# Patient Record
(demographics unavailable — no encounter records)

---

## 2024-10-25 NOTE — ASSESSMENT
[FreeTextEntry1] : Unable to assess vaccination and age appropriate screening due to time constraint (pt late for visit for 30min)

## 2024-10-25 NOTE — PLAN
[FreeTextEntry1] : HCM - Labs today: CBC, CMP, tissue transglutaminase IgA, B12, BNP, urine A/C, HIV, HCV, lipid panel    Vax: to be assessed next visit  [ ] Flu:  [ ] COVID:  [ ] Tdap (q10yrs):  [ ] Prevnar (<65 w/ risk factors or >65):  [ ] Shingrex (<50 if immunocompromised or >50, 2 doses 2-6 months apart):    Screening [ ] HIV (15-65): check today   [ ] HCV (18-79): check today  [ ] Colonoscopy (q10yr 45-75): GI referral given    RTC in 5 weeks    Discussed w/ Dr. Medina

## 2024-10-25 NOTE — PHYSICAL EXAM
[No Acute Distress] : no acute distress [PERRL] : pupils equal round and reactive to light [EOMI] : extraocular movements intact [Normal Oropharynx] : the oropharynx was normal [No JVD] : no jugular venous distention [Supple] : supple [No Respiratory Distress] : no respiratory distress  [Clear to Auscultation] : lungs were clear to auscultation bilaterally [Regular Rhythm] : with a regular rhythm [Normal S1, S2] : normal S1 and S2 [No Murmur] : no murmur heard [No Varicosities] : no varicosities [Soft] : abdomen soft [Non Tender] : non-tender [Non-distended] : non-distended [No Spinal Tenderness] : no spinal tenderness [No Joint Swelling] : no joint swelling [Grossly Normal Strength/Tone] : grossly normal strength/tone [Coordination Grossly Intact] : coordination grossly intact [No Focal Deficits] : no focal deficits [Normal Gait] : normal gait [Normal Affect] : the affect was normal [Normal Insight/Judgement] : insight and judgment were intact [de-identified] : Regular, mildly tachycardic 100s  [de-identified] : 2_ pitting edema to b/l mid-shin  [de-identified] : hemorrhagic 1cm circular blister x1 in each hand, callous in hand nuckalebes

## 2024-10-25 NOTE — HISTORY OF PRESENT ILLNESS
[Admitted on: ___] : The patient was admitted on [unfilled] [Discharged on ___] : discharged on [unfilled] [FreeTextEntry2] : 50yom w/ PMH of recently dx T1DM during recent hospitalization at [facility] 10/2 - 10/9/24 for worsening fatigue/myalgias found to have HbA1c 14% and mild DKA (not requiring insulin gtt).  C-peptide low 0.4, NI positive 1.12, Zinc transporter 8 Ab positive 67. (IA-2 negative). Patient also with neuropathy described as heaviness. CT lumbar notable for L4-5 disc bulge and bilateral hypertrophic facet joint changes. Mild narrowing of both neural foramen. Started on gabapentin, Lantus 27u + 7u premeal on discharge.   Patient reports since discharge, he has been taking insulin as prescribed except for when he gets hypoglycemic to 40s to 50s in the morning.  He would decrease his morning Lantus from 27 units to 10 or 15 units.  Complained of chronic lower extremity swelling for 1 year, chronic alternating constipation with diarrhea for 1 year, chronic shooting pain from bilateral buttock to toes with decreased sensation of both feet.  Endorse fatigue, feeling cold.  Unspecified weight loss. Denies chest pain, shortness of breath, abdominal pain, fever, chills, nausea/vomiting.   # Type 1 diabetes -Newly diagnosed during recent hospitalization, A1c 14% -Reports has no follow-up appointment with endocrinology -Understand importance of  not missing insulin doses -Has kimberly 3, reports shows very high 38%, high 20%, target range 36%, low 5%, very low 1%.  # Diabetic neuropathy -Reports decree sensation of both feet with numbness/tingling -Has been taking gabapentin 300 3 times daily with mild improvement -Never seen a podiatrist  # Chronic diarrhea/constipation -Report for the last week has been having 5 loose BM a day, previously alternating with constipation for about a year -Denies abdominal pain, nausea/vomiting, fever/chills  # Lower extremity swelling -Reports over 1 year of lower extremity swelling, pitting -Unsure if worse at the end of day, states pretty constant, requesting water pill

## 2024-10-25 NOTE — REVIEW OF SYSTEMS
[Fatigue] : fatigue [Constipation] : constipation [Diarrhea] : diarrhea [Fever] : no fever [Chills] : no chills [Vision Problems] : no vision problems [Chest Pain] : no chest pain [Orthopena] : no orthopnea [Shortness Of Breath] : no shortness of breath [Abdominal Pain] : no abdominal pain [Nausea] : no nausea [Dysuria] : no dysuria [Vomiting] : no vomiting [Joint Pain] : no joint pain [Headache] : no headache [Easy Bleeding] : no easy bleeding

## 2024-10-25 NOTE — PHYSICAL EXAM
[No Acute Distress] : no acute distress [PERRL] : pupils equal round and reactive to light [EOMI] : extraocular movements intact [Normal Oropharynx] : the oropharynx was normal [No JVD] : no jugular venous distention [Supple] : supple [No Respiratory Distress] : no respiratory distress  [Clear to Auscultation] : lungs were clear to auscultation bilaterally [Regular Rhythm] : with a regular rhythm [Normal S1, S2] : normal S1 and S2 [No Murmur] : no murmur heard [No Varicosities] : no varicosities [Soft] : abdomen soft [Non Tender] : non-tender [Non-distended] : non-distended [No Spinal Tenderness] : no spinal tenderness [No Joint Swelling] : no joint swelling [Grossly Normal Strength/Tone] : grossly normal strength/tone [Coordination Grossly Intact] : coordination grossly intact [No Focal Deficits] : no focal deficits [Normal Gait] : normal gait [Normal Affect] : the affect was normal [Normal Insight/Judgement] : insight and judgment were intact [de-identified] : Regular, mildly tachycardic 100s  [de-identified] : hemorrhagic 1cm circular blister x1 in each hand, callous in hand nuckalebes  [de-identified] : 2_ pitting edema to b/l mid-shin

## 2024-11-08 NOTE — HISTORY OF PRESENT ILLNESS
[FreeTextEntry8] : Patient is a 50 year old man with a history of T1DM on insulin who presents to clinic for an acute visit for 10/10 low back pain. Patient has been dealing with this pain for around 2 years. The pain is sharp/burning and starts in the lower back and radiates down the bilateral legs to the heels. Pain is described as unrelenting. Pain does not change with ambulation/activity or with position changes. Patient has been given gabapentin 400 mg TID in the past but has not worked. He has also tried OTC tylenol and ibuprofen which have not resolved the symptoms. Patient tried chiropractor as was not satisfied. Patient not interested in narcotics. Of note patient noting some bowel incontinence. She notes about 1-2 times/week he will have either difficulty making it to the bathroom or will defecate in his pants. This is relatively new occurring over the last several months. Patient denies urinary incontinence or saddle anesthesia. Patient was recently admitted to the hospital from 10/2-10/9/2024 found to be in mild DKA. CT lumbar there notable for L4-5 disc bulge and bilateral hypertrophic facet joint changes. Mild narrowing of both neural foramen. Patient ROS negative for fevers or chills.

## 2024-11-08 NOTE — REVIEW OF SYSTEMS
[Back Pain] : back pain [Unsteady Walk] : ataxia [Fever] : no fever [Chills] : no chills [Chest Pain] : no chest pain [Shortness Of Breath] : no shortness of breath [Abdominal Pain] : no abdominal pain [Dysuria] : no dysuria [Incontinence] : no incontinence [Hesitancy] : no hesitancy [Frequency] : no frequency [Joint Stiffness] : no joint stiffness [Muscle Weakness] : no muscle weakness [Headache] : no headache [Dizziness] : no dizziness [Fainting] : no fainting [Memory Loss] : no memory loss

## 2024-11-08 NOTE — ASSESSMENT
[FreeTextEntry1] :  #HCM #T1DM #HTN - To be followed at next appointment on 12/2/2024 with Dr. Refugio Cobb PGY-1 Case Discussed with Dr. [facility]  RTC on 12/2/2024 with Dr. Huff to manage T1DM, HTN, and f/u on lumbar imaging or spine referral recs

## 2024-11-08 NOTE — PHYSICAL EXAM
[Well Nourished] : well nourished [Well Developed] : well developed [Normal Sclera/Conjunctiva] : normal sclera/conjunctiva [EOMI] : extraocular movements intact [No JVD] : no jugular venous distention [Supple] : supple [No Edema] : there was no peripheral edema [Normal] : affect was normal and insight and judgment were intact [de-identified] : ill-appearing, in pain [de-identified] : Straight leg raise positive for pain bilaterally [de-identified] : unsteady gait due to pain

## 2024-11-08 NOTE — PHYSICAL EXAM
[Well Nourished] : well nourished [Well Developed] : well developed [Normal Sclera/Conjunctiva] : normal sclera/conjunctiva [EOMI] : extraocular movements intact [No JVD] : no jugular venous distention [Supple] : supple [No Edema] : there was no peripheral edema [Normal] : affect was normal and insight and judgment were intact [de-identified] : ill-appearing, in pain [de-identified] : Straight leg raise positive for pain bilaterally [de-identified] : unsteady gait due to pain

## 2024-11-14 NOTE — RESULTS/DATA
[FreeTextEntry1] : CC: [id number] EXAM: CT LUMBAR SPINE ORDERED BY: ELIZABETH MARION  PROCEDURE DATE: 10/02/2024    INTERPRETATION: Clinical indications: Back pain.  Multiple axial sections were performed through the lumbar spine. Coronal and sagittal reconstructions were performed  Loss of the normal cervical lordosis is seen  The vertebral body height and alignment appear normal.  No acute fractures or dislocations are identified  T11-12: Normal  T12-L1: Normal  L1-2: Normal  L2-3: Normal  L3-4: Mild disc bulge is seen without significant compromise of the spinal canal or either neural foramen  L4-5: Disc bulge and bilateral hypertrophic facet joint changes. Mild narrowing of both neural foramen  Sacralization of L5 is seen.  L5-S1: Normal  Evaluation of the paraspinal soft tissues is limited by lack of IV contrast though grossly normal  Dilated bladder is identified. Please correlate clinically  IMPRESSION: Mild degenerative changes as described above.    --- End of Report ---      RONAL LIND MD; Attending Radiologis

## 2024-11-14 NOTE — PHYSICAL EXAM
[General Appearance - Alert] : alert [General Appearance - In No Acute Distress] : in no acute distress [Person] : oriented to person [Place] : oriented to place [Time] : oriented to time [5] : L4/5 ankle dorsiflexors 5/5 [Sensation Tactile Decrease] : light touch was intact [2+] : Ankle jerk left 2+ [Short Term Intact] : short term memory intact [Remote Intact] : remote memory intact [Span Intact] : the attention span was normal [Concentration Intact] : normal concentrating ability [Fluency] : fluency intact [Comprehension] : comprehension intact [Current Events] : adequate knowledge of current events [Past History] : adequate knowledge of personal past history [Vocabulary] : adequate range of vocabulary [Cranial Nerves Optic (II)] : visual acuity intact bilaterally,  pupils equal round and reactive to light [Cranial Nerves Oculomotor (III)] : extraocular motion intact [Cranial Nerves Trigeminal (V)] : facial sensation intact symmetrically [Cranial Nerves Facial (VII)] : face symmetrical [Cranial Nerves Vestibulocochlear (VIII)] : hearing was intact bilaterally [Cranial Nerves Glossopharyngeal (IX)] : tongue and palate midline [Cranial Nerves Accessory (XI - Cranial And Spinal)] : head turning and shoulder shrug symmetric [Cranial Nerves Hypoglossal (XII)] : there was no tongue deviation with protrusion [Motor Tone] : muscle tone was normal in all four extremities [Motor Strength] : muscle strength was normal in all four extremities [No Muscle Atrophy] : normal bulk in all four extremities [Abnormal Walk] : normal gait [Balance] : balance was intact [Past-pointing] : there was no past-pointing [Tremor] : no tremor present [Maria] : Maria's sign was not demonstrated [No Tenderness to Palpation] : no spine tenderness on palpation [Straight-Leg Raise Test - Left] : straight leg raise of the left leg was negative [Straight-Leg Raise Test - Right] : straight leg raise  of the right leg was negative [Able to toe walk] : the patient was able to toe walk [Able to heel walk] : the patient was able to heel walk

## 2024-11-14 NOTE — PHYSICAL EXAM
[General Appearance - Alert] : alert [General Appearance - In No Acute Distress] : in no acute distress [Person] : oriented to person [Place] : oriented to place [Time] : oriented to time [5] : L4/5 ankle dorsiflexors 5/5 [Sensation Tactile Decrease] : light touch was intact [2+] : Ankle jerk left 2+ [Short Term Intact] : short term memory intact [Remote Intact] : remote memory intact [Span Intact] : the attention span was normal [Concentration Intact] : normal concentrating ability [Fluency] : fluency intact [Comprehension] : comprehension intact [Current Events] : adequate knowledge of current events [Past History] : adequate knowledge of personal past history [Vocabulary] : adequate range of vocabulary [Cranial Nerves Optic (II)] : visual acuity intact bilaterally,  pupils equal round and reactive to light [Cranial Nerves Trigeminal (V)] : facial sensation intact symmetrically [Cranial Nerves Oculomotor (III)] : extraocular motion intact [Cranial Nerves Facial (VII)] : face symmetrical [Cranial Nerves Vestibulocochlear (VIII)] : hearing was intact bilaterally [Cranial Nerves Glossopharyngeal (IX)] : tongue and palate midline [Cranial Nerves Accessory (XI - Cranial And Spinal)] : head turning and shoulder shrug symmetric [Cranial Nerves Hypoglossal (XII)] : there was no tongue deviation with protrusion [Motor Tone] : muscle tone was normal in all four extremities [Motor Strength] : muscle strength was normal in all four extremities [No Muscle Atrophy] : normal bulk in all four extremities [Abnormal Walk] : normal gait [Balance] : balance was intact [Past-pointing] : there was no past-pointing [Tremor] : no tremor present [Maria] : Maira's sign was not demonstrated [No Tenderness to Palpation] : no spine tenderness on palpation [Straight-Leg Raise Test - Left] : straight leg raise of the left leg was negative [Straight-Leg Raise Test - Right] : straight leg raise  of the right leg was negative [Able to toe walk] : the patient was able to toe walk [Able to heel walk] : the patient was able to heel walk

## 2024-11-14 NOTE — HISTORY OF PRESENT ILLNESS
[de-identified] : Jacek is here fora new patient visit.  Chief complaint of low back pain that radiated to his buttocks down the back of his leg.  He has associated numbness and tingling of both legs in addition to both hands. Has not seen any neurologist or rheumatologist yet. He also describes temperature changes in his hands and feet.  He is a diabetic.  He also describes some intermittent swelling of his hands and his feet.  He describes pain in multiple areas. A CT scan of the lumbar spine did not show any fracture or subluxation.

## 2024-11-14 NOTE — REVIEW OF SYSTEMS
[Numbness] : numbness [Tingling] : tingling [Abnormal Sensation] : an abnormal sensation [As Noted in HPI] : as noted in HPI [Negative] : Heme/Lymph [FreeTextEntry9] : low back pain

## 2024-11-14 NOTE — RESULTS/DATA
[FreeTextEntry1] : CC: [id number] EXAM: CT LUMBAR SPINE ORDERED BY: ELIZABETH MARION  PROCEDURE DATE: 10/02/2024    INTERPRETATION: Clinical indications: Back pain.  Multiple axial sections were performed through the lumbar spine. Coronal and sagittal reconstructions were performed  Loss of the normal cervical lordosis is seen  The vertebral body height and alignment appear normal.  No acute fractures or dislocations are identified  T11-12: Normal  T12-L1: Normal  L1-2: Normal  L2-3: Normal  L3-4: Mild disc bulge is seen without significant compromise of the spinal canal or either neural foramen  L4-5: Disc bulge and bilateral hypertrophic facet joint changes. Mild narrowing of both neural foramen  Sacralization of L5 is seen.  L5-S1: Normal  Evaluation of the paraspinal soft tissues is limited by lack of IV contrast though grossly normal  Dilated bladder is identified. Please correlate clinically  IMPRESSION: Mild degenerative changes as described above.    --- End of Report ---      RONAL LIND MD; Attending Radiologis

## 2024-11-14 NOTE — HISTORY OF PRESENT ILLNESS
[de-identified] : Jacek is here fora new patient visit.  Chief complaint of low back pain that radiated to his buttocks down the back of his leg.  He has associated numbness and tingling of both legs in addition to both hands. Has not seen any neurologist or rheumatologist yet. He also describes temperature changes in his hands and feet.  He is a diabetic.  He also describes some intermittent swelling of his hands and his feet.  He describes pain in multiple areas. A CT scan of the lumbar spine did not show any fracture or subluxation.

## 2024-11-14 NOTE — ASSESSMENT
[FreeTextEntry1] : Impression: 50yr old male with chief complaint of low back pain that radiated to his buttocks down the back of his leg. He has associated numbness and tingling of both legs in addition to both hands. Neurological exam normal  CT lumbar spine 10/02/2024- degenerative changes   Plan: Do not recommend surgery Recommend referral to neurologist Dr. Dianne Alford

## 2024-11-25 NOTE — PHYSICAL EXAM
[de-identified] : Gen: NAD Head: NC/AT Eyes: no glasses, no scleral icterus ENT: mucous membranes moist CV: No JVD Lungs: nonlabored breathing Abd: soft, NT/ND Ext: full ROM in all extremities, no peripheral edema Back: +TTPI in the lumbar paraspinal region, +SLR bilaterally Neuro: CN intact LEs +5 L +5 R hip flexion +5 L +5 R leg extension +5 L +5 R leg flexion +5 L +5 R foot dorsiflexion +5 L +5 R foot plantarflexion +5 L +5 R EHL extension Psych: normal affect Skin: no visible lesions

## 2024-11-25 NOTE — PHYSICAL EXAM
[de-identified] : Gen: NAD Head: NC/AT Eyes: no glasses, no scleral icterus ENT: mucous membranes moist CV: No JVD Lungs: nonlabored breathing Abd: soft, NT/ND Ext: full ROM in all extremities, no peripheral edema Back: +TTPI in the lumbar paraspinal region, +SLR bilaterally Neuro: CN intact LEs +5 L +5 R hip flexion +5 L +5 R leg extension +5 L +5 R leg flexion +5 L +5 R foot dorsiflexion +5 L +5 R foot plantarflexion +5 L +5 R EHL extension Psych: normal affect Skin: no visible lesions

## 2024-11-25 NOTE — DATA REVIEWED
[FreeTextEntry1] : 10/2/2024 CT Lumbar Spine ([facility]) INTERPRETATION: Clinical indications: Back pain.  Multiple axial sections were performed through the lumbar spine. Coronal and sagittal reconstructions were performed  Loss of the normal cervical lordosis is seen  The vertebral body height and alignment appear normal.  No acute fractures or dislocations are identified  T11-12: Normal  T12-L1: Normal  L1-2: Normal  L2-3: Normal  L3-4: Mild disc bulge is seen without significant compromise of the spinal canal or either neural foramen  L4-5: Disc bulge and bilateral hypertrophic facet joint changes. Mild narrowing of both neural foramen  Sacralization of L5 is seen.  L5-S1: Normal  Evaluation of the paraspinal soft tissues is limited by lack of IV contrast though grossly normal  Dilated bladder is identified. Please correlate clinically  IMPRESSION: Mild degenerative changes as described above.

## 2024-11-25 NOTE — DISCUSSION/SUMMARY
[de-identified] : PATRICAI HANNA is a 50 year-old man presenting for a NPV for a history of chronic low back pain.   Prior treatment: Gabapentin Acetaminophen  OTC NSAIDs  Plan: 1) CT lumbar spine images reviewed with the patient. 2) Initiate physical therapy - script provided 3) TPI today in the lumbar paraspinal region. The procedure was explained to the patient in detail. Reviewed risks, benefits, and alternatives with the patient. Some risks discussed included temporary increase in pain, bleeding, infection, and side effects from steroids. The patient expressed understanding and would like to proceed. 4) Consider GEOFFREY in the future 5) Trial cyclobenzaprine 5mg BID prn; Discussed AEs, including sedation, dizziness, somnolence. Patient told to use caution when driving after taking the first few doses. 6) RTC 2 months

## 2024-11-25 NOTE — DATA REVIEWED
[FreeTextEntry1] : 10/2/2024 CT Lumbar Spine ([facility]) INTERPRETATION: Clinical indications: Back pain.  Multiple axial sections were performed through the lumbar spine. Coronal and sagittal reconstructions were performed  Loss of the normal cervical lordosis is seen  The vertebral body height and alignment appear normal.  No acute fractures or dislocations are identified  T11-12: Normal  T12-L1: Normal  L1-2: Normal  L2-3: Normal  L3-4: Mild disc bulge is seen without significant compromise of the spinal canal or either neural foramen  L4-5: Disc bulge and bilateral hypertrophic facet joint changes. Mild narrowing of both neural foramen  Sacralization of L5 is seen.  L5-S1: Normal  Evaluation of the paraspinal soft tissues is limited by lack of IV contrast though grossly normal  Dilated bladder is identified. Please correlate clinically  IMPRESSION: Mild degenerative changes as described above.

## 2024-11-25 NOTE — HISTORY OF PRESENT ILLNESS
[Lower back] : lower back [Buttock] : buttock [Left Leg] : left leg [Right Leg] : right leg [10] : 10 [Burning] : burning [Dull/Aching] : dull/aching [Radiating] : radiating [Shooting] : shooting [Stabbing] : stabbing [Throbbing] : throbbing [Tingling] : tingling [Constant] : constant [Household chores] : household chores [Leisure] : leisure [Sleep] : sleep [Nothing helps with pain getting better] : Nothing helps with pain getting better [Sitting] : sitting [Standing] : standing [Walking] : walking [Stairs] : stairs [FreeTextEntry1] : 11/25/2024: PATRICIA HANNA is a 50 year-old man presenting for a NPV for a history of chronic low back pain. Patient notes a 2-3 year history of pain in the low back pain. He has never had this pain treated previously. Patient describes an aching pain across the low back w/ radiation to the bilateral lower extremities. Notes intermittent tingling and numbness. No weakness.  The patient states that average pain over the past week was 10/10 in severity. Mood: Patient has occasional depression, no anxiety.  Sleep: Patient notes difficulty with sleep initiation and maintenance 2/2 pain.  [] : no [FreeTextEntry6] : numbness and tingling in bilateral hands, legs and feet  [FreeTextEntry7] : Bialateral hands, feet and legs  [de-identified] : Gabapentin 400mg did not help

## 2024-11-25 NOTE — PROCEDURE
[Trigger point 1-2 muscle groups] : trigger point 1-2 muscle groups [Bilateral] : bilaterally of the [Lumbar paraspinal muscle] : lumbar paraspinal muscle [Pain] : pain [Alcohol] : alcohol [Ethyl Chloride sprayed topically] : ethyl chloride sprayed topically [___ cc    1%] : Lidocaine ~Vcc of 1%  [___ cc    10mg] : Triamcinolone (Kenalog) ~Vcc of 10 mg  [] : Patient tolerated procedure well [Risks, benefits, alternatives discussed / Verbal consent obtained] : the risks benefits, and alternatives have been discussed, and verbal consent was obtained

## 2024-11-25 NOTE — DISCUSSION/SUMMARY
[de-identified] : PATRICIA HANNA is a 50 year-old man presenting for a NPV for a history of chronic low back pain.   Prior treatment: Gabapentin Acetaminophen  OTC NSAIDs  Plan: 1) CT lumbar spine images reviewed with the patient. 2) Initiate physical therapy - script provided 3) TPI today in the lumbar paraspinal region. The procedure was explained to the patient in detail. Reviewed risks, benefits, and alternatives with the patient. Some risks discussed included temporary increase in pain, bleeding, infection, and side effects from steroids. The patient expressed understanding and would like to proceed. 4) Consider GEOFFREY in the future 5) Trial cyclobenzaprine 5mg BID prn; Discussed AEs, including sedation, dizziness, somnolence. Patient told to use caution when driving after taking the first few doses. 6) RTC 2 months

## 2024-11-25 NOTE — HISTORY OF PRESENT ILLNESS
[Lower back] : lower back [Buttock] : buttock [Left Leg] : left leg [Right Leg] : right leg [10] : 10 [Burning] : burning [Dull/Aching] : dull/aching [Radiating] : radiating [Shooting] : shooting [Stabbing] : stabbing [Throbbing] : throbbing [Tingling] : tingling [Constant] : constant [Household chores] : household chores [Leisure] : leisure [Sleep] : sleep [Nothing helps with pain getting better] : Nothing helps with pain getting better [Sitting] : sitting [Standing] : standing [Walking] : walking [Stairs] : stairs [FreeTextEntry1] : 11/25/2024: PATRICIA HANNA is a 50 year-old man presenting for a NPV for a history of chronic low back pain. Patient notes a 2-3 year history of pain in the low back pain. He has never had this pain treated previously. Patient describes an aching pain across the low back w/ radiation to the bilateral lower extremities. Notes intermittent tingling and numbness. No weakness.  The patient states that average pain over the past week was 10/10 in severity. Mood: Patient has occasional depression, no anxiety.  Sleep: Patient notes difficulty with sleep initiation and maintenance 2/2 pain.  [] : no [FreeTextEntry6] : numbness and tingling in bilateral hands, legs and feet  [FreeTextEntry7] : Bialateral hands, feet and legs  [de-identified] : Gabapentin 400mg did not help

## 2024-12-02 NOTE — HISTORY OF PRESENT ILLNESS
[FreeTextEntry1] : Back pain [de-identified] : Patient is a 49y/o male with h/o T1D presenting for a follow up.  #back pain - patient reports persistent back pain, 10/10, primarily in his gluteal muscles and reports that it radiates down his legs - pain persists when sitting and standing - pain not resolved despite gabapentin, cyclobenzaprine - patient was given a referral to physical therapy but has not yet gone - saw neurosurgeon Dr. Sin 11/14 who diagnosed the patient with chronic b/l lower back pain with b/l sciatica - saw ortho pain management who gave trigger point injections on 11/25 which did not help - saw neurologist Dr. Alford who recommended physical therapy  #T1DM - needs more freestyle tawanda sensors/recievers - Freestyle Tawanda Info: Average glucose 318, 73% very high, 16% high, 10% target range, 1% low - Patient takes lantus 24 units at night, at 10 units before meals.  - patient reports one hypoglycemic episode last week to 50s, felt weak with slurred speech but did not faint - diet: eggs and sandwiches.

## 2024-12-02 NOTE — PHYSICAL EXAM
[No Acute Distress] : no acute distress [Normal] : no respiratory distress, lungs were clear to auscultation bilaterally and no accessory muscle use [Soft] : abdomen soft [Non Tender] : non-tender [Non-distended] : non-distended [No Spinal Tenderness] : no spinal tenderness [de-identified] : tachycardic, regular rhythm [de-identified] : no paraspinal muscle tenderness [de-identified] : 4/5 strength b/l lower extremities, 5/5 strength b/l uppe rextremities [de-identified] : + hyperpigmented lesions on bilateral upper and lowe rextremities [de-identified] : decreased reflexes bilateral legs

## 2024-12-02 NOTE — REVIEW OF SYSTEMS
[Diarrhea] : diarrhea [Muscle Weakness] : muscle weakness [Back Pain] : back pain [Negative] : Neurological

## 2024-12-02 NOTE — PHYSICAL EXAM
[No Acute Distress] : no acute distress [Normal] : no respiratory distress, lungs were clear to auscultation bilaterally and no accessory muscle use [Soft] : abdomen soft [Non Tender] : non-tender [Non-distended] : non-distended [No Spinal Tenderness] : no spinal tenderness [de-identified] : tachycardic, regular rhythm [de-identified] : no paraspinal muscle tenderness [de-identified] : 4/5 strength b/l lower extremities, 5/5 strength b/l uppe rextremities [de-identified] : + hyperpigmented lesions on bilateral upper and lowe rextremities [de-identified] : decreased reflexes bilateral legs

## 2024-12-02 NOTE — ASSESSMENT
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      - dermatology referral sent - Freestyle [place] Info: Average glucose 318, 73% very high, 16% high, 10% target range, 1% low - patient reports does not miss insulin doses - new insulin dosing: lantus 24 units, lispro 12 units before large meal and 10 units before the other 2 meals - ophthalmology referral sent  # Diabetic neuropathy -Reports decreased sensation of both feet with numbness/tingling - No longer takes gabapentin as it has not improved his peripheral neuropathy - Podiatry referral sent 10/24/24  # Chronic diarrhea/constipation - patient reports intermittent constipation and diarrhea - colonoscopy referral sent - TTG level sent - f/u quantitative IgA - GI referral sent 10/24/24, patient has not yet scheduled an appointment  #Back pain - CT Lumbar Spine with L3-L4 mild disc bulge without significant compromise of spinal canal or other neural foramen, L4-L5 disc bulge and b/l hypertrophic facet joint changes. Mild narrowing of foramen. Mild degenerative changes. - continue to follow with neurology and neurosurgery - encouraged to follow up with physical therapy, referral sent at previous visit - pending MRI lumbar spine from acute med visit 11/8 - f/u ESR/CRP, CK, PSA  #Anemia - hgb 11.5 - patient denies blood in stool - repeat CBC, f/u ferritin, haptoglobin, reticulocyte count

## 2024-12-02 NOTE — HISTORY OF PRESENT ILLNESS
[FreeTextEntry1] : Back pain [de-identified] : Patient is a 49y/o male with h/o T1D presenting for a follow up.  #back pain - patient reports persistent back pain, 10/10, primarily in his gluteal muscles and reports that it radiates down his legs - pain persists when sitting and standing - pain not resolved despite gabapentin, cyclobenzaprine - patient was given a referral to physical therapy but has not yet gone - saw neurosurgeon Dr. Sin 11/14 who diagnosed the patient with chronic b/l lower back pain with b/l sciatica - saw ortho pain management who gave trigger point injections on 11/25 which did not help - saw neurologist Dr. Alford who recommended physical therapy  #T1DM - needs more freestyle tawanda sensors/recievers - Freestyle Tawanda Info: Average glucose 318, 73% very high, 16% high, 10% target range, 1% low - Patient takes lantus 24 units at night, at 10 units before meals.  - patient reports one hypoglycemic episode last week to 50s, felt weak with slurred speech but did not faint - diet: eggs and sandwiches.

## 2025-01-08 NOTE — HISTORY OF PRESENT ILLNESS
[FreeTextEntry1] : Neuropathy [de-identified] : Patient is a 51y/o male with h/o T1D presenting for a follow up:  #T1D - Freestyle kimberly: average glucose 245, GMI 9/2%, 50% very high, 25% high, 22% target range, 2% low, 1% very low - takes 12u lispro breakfast, lunch and dinner and 24u lantus at night - meals primarily consist of eggs, purchases meals with snap benefits at local store  #Neuropathy - Neurology: Dr. Almonte, performed ?EMG test per patient, awaiting results at appointment 1/27/25 - Podiatry: Dr. Lisa diagnosed the patient with diabetic neuropathy and cervical myelopathy. Was given a topical compounding cream for pain to try for 4 weeks and monitor for improvement. So far no improvement after one week of use. - Feels burning, tingling, numbness, cold sensation from lower back to the glutes, hamstrings, calves, feet, toes, hands b/l, legs b/l, not improved since last visit. Has also lost balance and fallen 2x. Patient reports mechanical fall due to weakness of his legs.  - Has not yet been to PT, will re-send referral - Pt has stopped taking gapabentin 400mg TID because it did not help with symptoms and he ran out of the medication - takes flexeril 5mg which has not helped  # HTN - Pt has blood pressure cuff but does not check at home - Denies chest pain, SOB, palpitations, blurry vision - Reports that his high blood pressure is due to the stress he has been experiencing with food/housing insecurity and being out of work due to back pain and neuropathy

## 2025-01-08 NOTE — PHYSICAL EXAM
[Pedal Pulses Present] : the pedal pulses are present [Normal] : soft, non-tender, non-distended, no masses palpated, no HSM and normal bowel sounds [de-identified] : No spinal or paraspinal tenderness, + straight leg raise bilaterally [de-identified] : Reduced sensation to light touch bilaterally from feet to mid-calf, reduced sensation from gluteus to posterior aspect of mid-thigh  [de-identified] : Linear area of hyperpigmentation on left calf. Hyperpigmented, rough, healing blisters on left second and third MCP joint.

## 2025-01-08 NOTE — HISTORY OF PRESENT ILLNESS
[FreeTextEntry1] : Neuropathy [de-identified] : Patient is a 49y/o male with h/o T1D presenting for a follow up:  #T1D - Freestyle k[place]berly: average glucose 245, GMI 9/2%, 50% very high, 25% high, 22% target range, 2% low, 1% very low - takes 12u lispro breakfast, lunch and dinner and 24u lantus at night - meals primarily consist of eggs, purchases meals with snap benefits at local store  #Neuropathy - Neurology: Dr. Almonte, performed ?EMG test per patient, awaiting results at appointment 1/27/25 - Podiatry: Dr. Lisa diagnosed the patient with diabetic neuropathy and cervical myelopathy. Was given a topical compounding cream for pain to try for 4 weeks and monitor for improvement. So far no improvement after one week of use. - Feels burning, tingling, numbness, cold sensation from lower back to the glutes, hamstrings, calves, feet, toes, hands b/l, legs b/l, not improved since last visit. Has also lost balance and fallen 2x. Patient reports mechanical fall due to weakness of his legs.  - Has not yet been to PT, will re-send referral - Pt has stopped taking gapabentin 400mg TID because it did not help with symptoms and he ran out of the medication - takes flexeril 5mg which has not helped  # HTN - Pt has blood pressure cuff but does not check at home - Denies chest pain, SOB, palpitations, blurry vision - Reports that his high blood pressure is due to the stress he has been experiencing with food/housing insecurity and being out of work due to back pain and neuropathy

## 2025-01-08 NOTE — PHYSICAL EXAM
[Pedal Pulses Present] : the pedal pulses are present [Normal] : soft, non-tender, non-distended, no masses palpated, no HSM and normal bowel sounds [de-identified] : No spinal or paraspinal tenderness, + straight leg raise bilaterally [de-identified] : Reduced sensation to light touch bilaterally from feet to mid-calf, reduced sensation from gluteus to posterior aspect of mid-thigh  [de-identified] : Linear area of hyperpigmentation on left calf. Hyperpigmented, rough, healing blisters on left second and third MCP joint.

## 2025-01-08 NOTE — PHYSICAL EXAM
[Pedal Pulses Present] : the pedal pulses are present [Normal] : soft, non-tender, non-distended, no masses palpated, no HSM and normal bowel sounds [de-identified] : No spinal or paraspinal tenderness, + straight leg raise bilaterally [de-identified] : Reduced sensation to light touch bilaterally from feet to mid-calf, reduced sensation from gluteus to posterior aspect of mid-thigh  [de-identified] : Linear area of hyperpigmentation on left calf. Hyperpigmented, rough, healing blisters on left second and third MCP joint.

## 2025-01-08 NOTE — HISTORY OF PRESENT ILLNESS
[FreeTextEntry1] : Neuropathy [de-identified] : Patient is a 51y/o male with h/o T1D presenting for a follow up:  #T1D - Freestyle kimberly: average glucose 245, GMI 9/2%, 50% very high, 25% high, 22% target range, 2% low, 1% very low - takes 12u lispro breakfast, lunch and dinner and 24u lantus at night - meals primarily consist of eggs, purchases meals with snap benefits at local store  #Neuropathy - Neurology: Dr. Almonte, performed ?EMG test per patient, awaiting results at appointment 1/27/25 - Podiatry: Dr. Lisa diagnosed the patient with diabetic neuropathy and cervical myelopathy. Was given a topical compounding cream for pain to try for 4 weeks and monitor for improvement. So far no improvement after one week of use. - Feels burning, tingling, numbness, cold sensation from lower back to the glutes, hamstrings, calves, feet, toes, hands b/l, legs b/l, not improved since last visit. Has also lost balance and fallen 2x. Patient reports mechanical fall due to weakness of his legs.  - Has not yet been to PT, will re-send referral - Pt has stopped taking gapabentin 400mg TID because it did not help with symptoms and he ran out of the medication - takes flexeril 5mg which has not helped  # HTN - Pt has blood pressure cuff but does not check at home - Denies chest pain, SOB, palpitations, blurry vision - Reports that his high blood pressure is due to the stress he has been experiencing with food/housing insecurity and being out of work due to back pain and neuropathy

## 2025-01-21 NOTE — ASSESSMENT
[FreeTextEntry1] : Patient is a 50 year old male who presents to the clinic to establish care for DM.   #T1DM/MAL Patient was hospitalized at [facility] in October 2024 with hyperglycemia. At that time, labs significant as follows: 10/2/24 c-peptide 0.4 (s. glu 229), NI Ab 1.12 (repeat 1.34 on 10/24/24), zinc transporter 8 ab elevated at 67, IA-2 negative, A1c 14.0% at that time. - A1c 11.4% (1/6/25) PLAN  - Continue Lantus 24 units qhs, Increase humalog to 15 units - check repeat c-peptide and BMP  - Ophtho not up to date, will give referral - Discussed hypoglycemia < 70mg/dL, risks, symptoms and management. - Discussed BG goals with patient, fasting 80-130mg/dL, premeal <140mg/dL, 2hours post prandial <180mg/dL - Counselled patient on lifestyle changes including diet modifications and exercise. - Nutrition referral provided, Omnipod teaching.  - sent diabetes supplies to pharmacy (glucometer, test strips, lancets, alcohol swabs, insulin pen needles)   #HLD - LDL 72, close to goal - not on statin, will hold off at this time as patient is complaining of erectile dysfunction that is troubling him.  - goal LDL in diabetes mellitus is <70  #Hx of Hypogonadism/erectile dysfunction likely secondary to neuropathy - Will check testosterone, SHBG, FSH, LH, prolactin at this time   #HTN - BP today elevated - patient on HCTZ - goal BP in diabetes mellitus is <130/80   RTC next available appointment for Omnipod teaching, appointment with in 2 months.   Discussed with attending physician Dr. Bolivar.  David Danielson,   PGY-4 Endocrine fellow

## 2025-01-21 NOTE — PHYSICAL EXAM
[Alert] : alert [Well Nourished] : well nourished [Healthy Appearance] : healthy appearance [Normal Sclera/Conjunctiva] : normal sclera/conjunctiva [EOMI] : extra ocular movement intact [No Neck Mass] : no neck mass was observed [No Respiratory Distress] : no respiratory distress [No Accessory Muscle Use] : no accessory muscle use [Clear to Auscultation] : lungs were clear to auscultation bilaterally [Normal S1, S2] : normal S1 and S2 [Normal Rate] : heart rate was normal [Regular Rhythm] : with a regular rhythm [Not Tender] : non-tender [Not Distended] : not distended [Soft] : abdomen soft [No Stigmata of Cushings Syndrome] : no stigmata of Cushings Syndrome [Normal Gait] : normal gait [No Motor Deficits] : the motor exam was normal [No Tremors] : no tremors [Oriented x3] : oriented to person, place, and time [Normal Affect] : the affect was normal [Normal Insight/Judgement] : insight and judgment were intact [Normal Mood] : the mood was normal

## 2025-01-21 NOTE — END OF VISIT
[] : Fellow [FreeTextEntry3] : Poorly controlled DM2 complicated by severe diabetic neuropathy along with spinal disc disease. Will increase insulin, continue GGM, set up teaching for Omnipod insulin pump. Erectile dysfunction likely due to neuropathy and hx of mild testosterone deficiency. Will recheck testosterone levels.

## 2025-01-21 NOTE — HISTORY OF PRESENT ILLNESS
[FreeTextEntry1] : Patient is a 50 year old male with past medical history including hypogonadism, DM who presents to the clinic to establish care.  Patient was hospitalized at [facility] in October 2024 with hyperglycemia. At that time, labs significant as follows: 10/2/24 c-peptide 0.4 (s. glu 229), NI Ab 1.12 (repeat 1.34 on 10/24/24), zinc transporter 8 ab elevated at 67, IA-2 negative, A1c 14.0% at that time.  LibreView 1/8/25-1/21/25  GMI 9.3% average glucose 249 time in range: very hgh 51%, high 23%, target range 24%, low 2%, very low 0% time CGM active 94% Trends notable for daytime highs especially in the evenings.   #DM history: --Endocrine history: diagnosed with MAL in 2024, prior to that was treated as T2DM at times since 2018. Was having polyuria, polydipsia, weight loss, picked up on employment exam with [employer] initially. Started insulin therapy about a year ago.  --Outpatient meds: basal insulin 24 units, mealtime insulin 12 units TIDAC. He is not sure what names. dark gray with meals, light gray once daily.  --Compliance: takes with all meals, basal insulin usually in the morning.  --Medications tried in the past: was on non-insulin therapy for a few years.  --who administers medications: takes it himself --Home glucose log: see Benji documented above --Hypoglycemic episodes: How often? Symptoms? states that it happens often and then goes high. Wakes up from alarm sometimes. Has general symptoms like weakness, blurry vision, slurred speech. Uses glucose tablets 1-2 when glucose is low.  --How long on insulin: about a year since 2023. --Hx of DKA/HHS: no --Macrovascular complications: Denies MI or CVA --Last ophthalmologist visit: about 2 years --Retinopathy: none at the time of his last visit.  --Neuropathy: has neuropathy in feet and hands, takes Cymbalta and gabapentin, follows with podiatry --urine albumin/Cr Ratio: last 1/6/25 not elevated --Family history: mother with DM in her 80s treated with metformin.  --Outpatient Diet: 24 hour recall Breakfast: egg white on a roll with butter, regular white bread roll Lunch: skipped yesterday, usually has two meals around 11 AM and around 7 PM Dinner: had chicken around 9 PM yesterday, usually has meat or chicken with a roll Drinks: tea with 2 spoons of sugar.  --Exercise: limited due to neuropathy --Insurance: [facility] --Smoking/Alcohol: --Weight: 142 lbs --BMI: 21.59 --Statin: not currently on --ACE/ARB: not currently on --BP: 150/72 on HCTZ  --LDL: 72 (10/24/24) --GFR: 10/24/24  #Hypogonadism history: Patient reports a remote history of hypogonadism, used to get testosterone injection therapy. Is experiencing erectile dysfunction at this time. Reports normal libido.

## 2025-01-21 NOTE — HISTORY OF PRESENT ILLNESS
[FreeTextEntry1] : Patient is a 50 year old male with past medical history including hypogonadism, DM who presents to the clinic to establish care.  Patient was hospitalized at [facility] in October 2024 with hyperglycemia. At that time, labs significant as follows: 10/2/24 c-peptide 0.4 (s. glu 229), NI Ab 1.12 (repeat 1.34 on 10/24/24), zinc transporter 8 ab elevated at 67, IA-2 negative, A1c 14.0% at that time.  LibreView 1/8/25-1/21/25  GMI 9.3% average glucose 249 time in range: very hgh 51%, high 23%, target range 24%, low 2%, very low 0% time CGM active 94% Trends notable for daytime highs especially in the evenings.   #DM history: --Endocrine history: diagnosed with MAL in 2024, prior to that was treated as T2DM at times since 2018. Was having polyuria, polydipsia, weight loss, picked up on employment exam with [employer] initially. Started insulin therapy about a year ago.  --Outpatient meds: basal insulin 24 units, mealtime insulin 12 units TIDAC. He is not sure what names. dark gray with meals, light gray once daily.  --Compliance: takes with all meals, basal insulin usually in the morning.  --Medications tried in the past: was on non-insulin therapy for a few years.  --who administers medications: takes it himself --Home glucose log: see Benji documented above --Hypoglycemic episodes: How often? Symptoms? states that it happens often and then goes high. Wakes up from alarm sometimes. Has general symptoms like weakness, blurry vision, slurred speech. Uses glucose tablets 1-2 when glucose is low.  --How long on insulin: about a year since 2023. --Hx of DKA/HHS: no --Macrovascular complications: Denies MI or CVA --Last ophthalmologist visit: about 2 years --Retinopathy: none at the time of his last visit.  --Neuropathy: has neuropathy in feet and hands, takes Cymbalta and gabapentin, follows with podiatry --urine albumin/Cr Ratio: last 1/6/25 not elevated --Family history: mother with DM in her 80s treated with metformin.  --Outpatient Diet: 24 hour recall Breakfast: egg white on a roll with butter, regular white bread roll Lunch: skipped yesterday, usually has two meals around 11 AM and around 7 PM Dinner: had chicken around 9 PM yesterday, usually has meat or chicken with a roll Drinks: tea with 2 spoons of sugar.  --Exercise: limited due to neuropathy --Insurance: [facility] --Smoking/Alcohol: --Weight: 142 lbs --BMI: 21.59 --Statin: not currently on --ACE/ARB: not currently on --BP: 150/72 on HCTZ  --LDL: 72 (10/24/24) --GFR: 10/24/24  #Hypogonadism history: Patient reports a remote history of hypogonadism, used to get testosterone injection therapy. Is experiencing erectile dysfunction at this time. Reports normal libido.

## 2025-01-21 NOTE — ASSESSMENT
[FreeTextEntry1] : Patient is a 50 year old male who presents to the clinic to establish care for DM.   #T1DM/MAL Patient was hospitalized at [facility] in October 2024 with hyperglycemia. At that time, labs significant as follows: 10/2/24 c-peptide 0.4 (s. glu 229), NI Ab 1.12 (repeat 1.34 on 10/24/24), zinc transporter 8 ab elevated at 67, IA-2 negative, A1c 14.0% at that time. - A1c 11.4% (1/6/25) PLAN  - Continue Lantus 24 units qhs, Increase humalog to 15 units - check repeat c-peptide and BMP  - Ophtho not up to date, will give referral - Discussed hypoglycemia < 70mg/dL, risks, symptoms and management. - Discussed BG goals with patient, fasting 80-130mg/dL, premeal <140mg/dL, 2hours post prandial <180mg/dL - Counselled patient on lifestyle changes including diet modifications and exercise. - Nutrition referral provided, Omnipod teaching.  - sent diabetes supplies to pharmacy (glucometer, test strips, lancets, alcohol swabs, insulin pen needles)   #HLD - LDL 72, close to goal - not on statin, will hold off at this time as patient is complaining of erectile dysfunction that is troubling him.  - goal LDL in diabetes mellitus is <70  #Hx of Hypogonadism/erectile dysfunction likely secondary to neuropathy - Will check testosterone, SHBG, FSH, LH, prolactin at this time   #HTN - BP today elevated - patient on HCTZ - goal BP in diabetes mellitus is <130/80   RTC next available appointment for Omnipod teaching, appointment with in 2 months.   Discussed with attending physician Dr. Bolivar.  David Danielson,   PGY-4 Endocrine fellow

## 2025-01-27 NOTE — PHYSICAL EXAM
[de-identified] : Gen: NAD Head: NC/AT Eyes: no glasses, no scleral icterus ENT: mucous membranes moist CV: No JVD Lungs: nonlabored breathing Abd: soft, NT/ND Ext: full ROM in all extremities, no peripheral edema Back: +TTPI in the lumbar paraspinal region, +SLR bilaterally Neuro: CN intact LEs +5 L +5 R hip flexion +5 L +5 R leg extension +5 L +5 R leg flexion +5 L +5 R foot dorsiflexion +5 L +5 R foot plantarflexion +5 L +5 R EHL extension Psych: normal affect Skin: no visible lesions

## 2025-01-27 NOTE — DISCUSSION/SUMMARY
[de-identified] : PATRICIA HANNA is a 50 year-old man presenting for a RPV for a history of chronic low back pain.   Prior treatment: Gabapentin Acetaminophen  OTC NSAIDs Prescribed home exercise regimen 11/2024 - 1/2025 TPI (11/25/2024 w/ steroid)  Plan: 1) CT lumbar spine images reviewed with the patient. 2) Continue home exercise regimen 3) Schedule BILATERAL L4-L5 TFESI w/o MAC. The procedure was explained to the patient in detail. Reviewed risks, benefits, and alternatives with the patient. Some risks discussed included temporary increase in pain, bleeding, infection, and side effects from steroids. The patient expressed understanding and would like to proceed. 4) Continue cyclobenzaprine 5mg BID prn; denies AEs 5) RTC post procedure

## 2025-01-27 NOTE — DATA REVIEWED
[FreeTextEntry1] : 10/2/2024 CT Lumbar Spine ([facility]) INTERPRETATION: Clinical indications: Back pain.  Multiple axial sections were performed through the lumbar spine. Coronal and sagittal reconstructions were performed  Loss of the normal cervical lordosis is seen  The vertebral body height and alignment appear normal.  No acute fractures or dislocations are identified  T11-12: Normal  T12-L1: Normal  L1-2: Normal  L2-3: Normal  L3-4: Mild disc bulge is seen without significant compromise of the spinal canal or either neural foramen  L4-5: Disc bulge and bilateral hypertrophic facet joint changes. Mild narrowing of both neural foramen  Sacralization of L5 is seen.  L5-S1: Normal  Evaluation of the paraspinal soft tissues is limited by lack of IV contrast though grossly normal  Dilated bladder is identified. Please correlate clinically  IMPRESSION: Mild degenerative changes as described above.

## 2025-01-27 NOTE — HISTORY OF PRESENT ILLNESS
[FreeTextEntry1] : 1/27/2025 PATRICIA HANNA is a 50 year-old man presenting for a RPV for a history of chronic low back pain. Patient underwent a TPI at last visit, which did not help significantly with his pain. The patient notes having ongoing pain in the low back w/ radiation to the bilateral posterior thighs and legs. Notes tingling and numbness. Notes having difficulty with his gait.  The patient states that average pain over the past week was 10/10 in severity. Mood: Patient has occasional depression, no anxiety.  Sleep: Patient notes difficulty with sleep initiation and maintenance 2/2 pain.   11/25/2024: PATRICIA HANNA is a 50 year-old man presenting for a NPV for a history of chronic low back pain. Patient notes a 2-3 year history of pain in the low back pain. He has never had this pain treated previously. Patient describes an aching pain across the low back w/ radiation to the bilateral lower extremities. Notes intermittent tingling and numbness. No weakness.  The patient states that average pain over the past week was 10/10 in severity. Mood: Patient has occasional depression, no anxiety.  Sleep: Patient notes difficulty with sleep initiation and maintenance 2/2 pain.  [] : no [FreeTextEntry6] : numbness and tingling in bilateral hands, legs and feet  [FreeTextEntry7] : Bialateral hands, feet and legs  [de-identified] : Gabapentin 400mg did not help

## 2025-01-27 NOTE — PHYSICAL EXAM
[de-identified] : Gen: NAD Head: NC/AT Eyes: no glasses, no scleral icterus ENT: mucous membranes moist CV: No JVD Lungs: nonlabored breathing Abd: soft, NT/ND Ext: full ROM in all extremities, no peripheral edema Back: +TTPI in the lumbar paraspinal region, +SLR bilaterally Neuro: CN intact LEs +5 L +5 R hip flexion +5 L +5 R leg extension +5 L +5 R leg flexion +5 L +5 R foot dorsiflexion +5 L +5 R foot plantarflexion +5 L +5 R EHL extension Psych: normal affect Skin: no visible lesions

## 2025-01-27 NOTE — DATA REVIEWED
[FreeTextEntry1] : 10/2/2024 CT Lumbar Spine ([facility]) INTERPRETATION: Clinical indications: Back pain.  Multiple axial sections were performed through the lumbar spine. Coronal and sagittal reconstructions were performed  Loss of the normal cervical lordosis is seen  The vertebral body height and alignment appear normal.  No acute fractures or dislocations are identified  T11-12: Normal  T12-L1: Normal  L1-2: Normal  L2-3: Normal  L3-4: Mild disc bulge is seen without significant compromise of the spinal canal or either neural foramen  L4-5: Disc bulge and bilateral hypertrophic facet joint changes. Mild narrowing of both neural foramen  Sacralization of L5 is seen.  L5-S1: Normal  Evaluation of the paraspinal soft tissues is limited by lack of IV contrast though grossly normal  Dilated bladder is identified. Please correlate clinically  IMPRESSION: Mild degenerative changes as described above.

## 2025-01-27 NOTE — HISTORY OF PRESENT ILLNESS
[FreeTextEntry1] : 1/27/2025 PATRICIA HANNA is a 50 year-old man presenting for a RPV for a history of chronic low back pain. Patient underwent a TPI at last visit, which did not help significantly with his pain. The patient notes having ongoing pain in the low back w/ radiation to the bilateral posterior thighs and legs. Notes tingling and numbness. Notes having difficulty with his gait.  The patient states that average pain over the past week was 10/10 in severity. Mood: Patient has occasional depression, no anxiety.  Sleep: Patient notes difficulty with sleep initiation and maintenance 2/2 pain.   11/25/2024: PATRICIA HANNA is a 50 year-old man presenting for a NPV for a history of chronic low back pain. Patient notes a 2-3 year history of pain in the low back pain. He has never had this pain treated previously. Patient describes an aching pain across the low back w/ radiation to the bilateral lower extremities. Notes intermittent tingling and numbness. No weakness.  The patient states that average pain over the past week was 10/10 in severity. Mood: Patient has occasional depression, no anxiety.  Sleep: Patient notes difficulty with sleep initiation and maintenance 2/2 pain.  [] : no [FreeTextEntry6] : numbness and tingling in bilateral hands, legs and feet  [FreeTextEntry7] : Bialateral hands, feet and legs  [de-identified] : Gabapentin 400mg did not help

## 2025-01-27 NOTE — DISCUSSION/SUMMARY
[de-identified] : PATRICIA HANNA is a 50 year-old man presenting for a RPV for a history of chronic low back pain.   Prior treatment: Gabapentin Acetaminophen  OTC NSAIDs Prescribed home exercise regimen 11/2024 - 1/2025 TPI (11/25/2024 w/ steroid)  Plan: 1) CT lumbar spine images reviewed with the patient. 2) Continue home exercise regimen 3) Schedule BILATERAL L4-L5 TFESI w/o MAC. The procedure was explained to the patient in detail. Reviewed risks, benefits, and alternatives with the patient. Some risks discussed included temporary increase in pain, bleeding, infection, and side effects from steroids. The patient expressed understanding and would like to proceed. 4) Continue cyclobenzaprine 5mg BID prn; denies AEs 5) RTC post procedure

## 2025-02-24 NOTE — HISTORY OF PRESENT ILLNESS
[Home] : at home, [unfilled] , at the time of the visit. [Medical Office: ([facility])___] : at the medical office located in  [Telephone (audio)] : This telephonic visit was provided via audio only technology. [Technical] : patient unable to effectively utilize tele-video due to technical issues. [Verbal consent obtained from patient] : the patient, [unfilled] [Post-hospitalization from ___ Hospital] : Post-hospitalization from [unfilled] Hospital [Discharge Summary] : discharge summary [Discharge Med List] : discharge medication list [Med Reconciliation] : medication reconciliation has been completed [FreeTextEntry4] : Jacek Rodas [FreeTextEntry2] : 50 M with DM!/MAL with b/l neuropathy, htn, and hygonadism presented to [facility] with nausea and vomtiing.  Pt was found to have hyperglycemia but did not meet criteria for DKA.  His symptoms improved with anti-emetics and IVF.  He was seen by endocrine who adjusted his insulin dosing.    Pt is using is CGM device and finds that most of his levels are between 100-200, although he does note that this morning he had a reading of 300.  He is using his lantus every morning as prescribed and his humalog as prescribed.  He sometimes will take a slightly higher dose of Humalog when his numbers run high, this is what he was told to do previously by another provider.  He continues to face food scarcity and is concerned about that.

## 2025-02-24 NOTE — ASSESSMENT
Medicare Preventive Visit Patient Instructions  Thank you for completing your Welcome to Medicare Visit or Medicare Annual Wellness Visit today  Your next wellness visit will be due in one year (8/10/2022)  The screening/preventive services that you may require over the next 5-10 years are detailed below  Some tests may not apply to you based off risk factors and/or age  Screening tests ordered at today's visit but not completed yet may show as past due  Also, please note that scanned in results may not display below  Preventive Screenings:  Service Recommendations Previous Testing/Comments   Colorectal Cancer Screening  * Colonoscopy    * Fecal Occult Blood Test (FOBT)/Fecal Immunochemical Test (FIT)  * Fecal DNA/Cologuard Test  * Flexible Sigmoidoscopy Age: 54-65 years old   Colonoscopy: every 10 years (may be performed more frequently if at higher risk)  OR  FOBT/FIT: every 1 year  OR  Cologuard: every 3 years  OR  Sigmoidoscopy: every 5 years  Screening may be recommended earlier than age 48 if at higher risk for colorectal cancer  Also, an individualized decision between you and your healthcare provider will decide whether screening between the ages of 74-80 would be appropriate  Colonoscopy: 06/07/2010  FOBT/FIT: Not on file  Cologuard: Not on file  Sigmoidoscopy: Not on file          Breast Cancer Screening Age: 36 years old  Frequency: every 1-2 years  Not required if history of left and right mastectomy Mammogram: Not on file        Cervical Cancer Screening Between the ages of 21-29, pap smear recommended once every 3 years  Between the ages of 33-67, can perform pap smear with HPV co-testing every 5 years     Recommendations may differ for women with a history of total hysterectomy, cervical cancer, or abnormal pap smears in past  Pap Smear: Not on file    Screening Not Indicated   Hepatitis C Screening Once for adults born between [facility]  More frequently in patients at high risk for [FreeTextEntry1] : 50 M with DM!/MAL with b/l neuropathy, htn, and hygonadism presented to [facility] with nausea and vomtiing.  Pt was found to have hyperglycemia but did not meet criteria for DKA.  His symptoms improved with anti-emetics and IVF.  He was seen by endocrine who adjusted his insulin dosing.    Pt is using is CGM device and finds that most of his levels are between 100-200, although he does note that this morning he had a reading of 300.  He is using his lantus every morning as prescribed and his humalog as prescribed.  He sometimes will take a slightly higher dose of Humalog when his numbers run high, this is what he was told to do previously by another provider.  He continues to face food scarcity and is concerned about that.   Hepatitis C Hep C Antibody: Not on file        Diabetes Screening 1-2 times per year if you're at risk for diabetes or have pre-diabetes Fasting glucose: 130 mg/dL   A1C: 5 9 %    Screening Current   Cholesterol Screening Once every 5 years if you don't have a lipid disorder  May order more often based on risk factors  Lipid panel: 02/16/2021    Screening Not Indicated  History Lipid Disorder     Other Preventive Screenings Covered by Medicare:  1  Abdominal Aortic Aneurysm (AAA) Screening: covered once if your at risk  You're considered to be at risk if you have a family history of AAA  2  Lung Cancer Screening: covers low dose CT scan once per year if you meet all of the following conditions: (1) Age 50-69; (2) No signs or symptoms of lung cancer; (3) Current smoker or have quit smoking within the last 15 years; (4) You have a tobacco smoking history of at least 30 pack years (packs per day multiplied by number of years you smoked); (5) You get a written order from a healthcare provider  3  Glaucoma Screening: covered annually if you're considered high risk: (1) You have diabetes OR (2) Family history of glaucoma OR (3)  aged 48 and older OR (3)  American aged 72 and older  3  Osteoporosis Screening: covered every 2 years if you meet one of the following conditions: (1) You're estrogen deficient and at risk for osteoporosis based off medical history and other findings; (2) Have a vertebral abnormality; (3) On glucocorticoid therapy for more than 3 months; (4) Have primary hyperparathyroidism; (5) On osteoporosis medications and need to assess response to drug therapy  · Last bone density test (DXA Scan): 04/09/2018  5  HIV Screening: covered annually if you're between the age of 12-76  Also covered annually if you are younger than 13 and older than 72 with risk factors for HIV infection  For pregnant patients, it is covered up to 3 times per pregnancy      Immunizations:  Immunization Recommendations   Influenza Vaccine Annual influenza vaccination during flu season is recommended for all persons aged >= 6 months who do not have contraindications   Pneumococcal Vaccine (Prevnar and Pneumovax)  * Prevnar = PCV13  * Pneumovax = PPSV23   Adults 25-60 years old: 1-3 doses may be recommended based on certain risk factors  Adults 72 years old: Prevnar (PCV13) vaccine recommended followed by Pneumovax (PPSV23) vaccine  If already received PPSV23 since turning 65, then PCV13 recommended at least one year after PPSV23 dose  Hepatitis B Vaccine 3 dose series if at intermediate or high risk (ex: diabetes, end stage renal disease, liver disease)   Tetanus (Td) Vaccine - COST NOT COVERED BY MEDICARE PART B Following completion of primary series, a booster dose should be given every 10 years to maintain immunity against tetanus  Td may also be given as tetanus wound prophylaxis  Tdap Vaccine - COST NOT COVERED BY MEDICARE PART B Recommended at least once for all adults  For pregnant patients, recommended with each pregnancy  Shingles Vaccine (Shingrix) - COST NOT COVERED BY MEDICARE PART B  2 shot series recommended in those aged 48 and above     Health Maintenance Due:      Topic Date Due    Hepatitis C Screening  Never done     Immunizations Due:      Topic Date Due    Influenza Vaccine (1) 09/01/2021     Advance Directives   What are advance directives? Advance directives are legal documents that state your wishes and plans for medical care  These plans are made ahead of time in case you lose your ability to make decisions for yourself  Advance directives can apply to any medical decision, such as the treatments you want, and if you want to donate organs  What are the types of advance directives? There are many types of advance directives, and each state has rules about how to use them  You may choose a combination of any of the following:  · Living will:   This is a written record of the treatment you want  You can also choose which treatments you do not want, which to limit, and which to stop at a certain time  This includes surgery, medicine, IV fluid, and tube feedings  · Durable power of  for healthcare [place] [facility]): This is a written record that states who you want to make healthcare choices for you when you are unable to make them for yourself  This person, called a proxy, is usually a family member or a friend  You may choose more than 1 proxy  · Do not resuscitate (DNR) order:  A DNR order is used in case your heart stops beating or you stop breathing  It is a request not to have certain forms of treatment, such as CPR  A DNR order may be included in other types of advance directives  · Medical directive: This covers the care that you want if you are in a coma, near death, or unable to make decisions for yourself  You can list the treatments you want for each condition  Treatment may include pain medicine, surgery, blood transfusions, dialysis, IV or tube feedings, and a ventilator (breathing machine)  · Values history: This document has questions about your views, beliefs, and how you feel and think about life  This information can help others choose the care that you would choose  Why are advance directives important? An advance directive helps you control your care  Although spoken wishes may be used, it is better to have your wishes written down  Spoken wishes can be misunderstood, or not followed  Treatments may be given even if you do not want them  An advance directive may make it easier for your family to make difficult choices about your care  Cigarette Smoking and Your Health   Risks to your health if you smoke:  Nicotine and other chemicals found in tobacco damage every cell in your body  Even if you are a light smoker, you have an increased risk for cancer, heart disease, and lung disease   If you are pregnant or have diabetes, smoking increases your risk for complications  Benefits to your health if you stop smoking:   · You decrease respiratory symptoms such as coughing, wheezing, and shortness of breath  · You reduce your risk for cancers of the lung, mouth, throat, kidney, bladder, pancreas, stomach, and cervix  If you already have cancer, you increase the benefits of chemotherapy  You also reduce your risk for cancer returning or a second cancer from developing  · You reduce your risk for heart disease, blood clots, heart attack, and stroke  · You reduce your risk for lung infections, and diseases such as pneumonia, asthma, chronic bronchitis, and emphysema  · Your circulation improves  More oxygen can be delivered to your body  If you have diabetes, you lower your risk for complications, such as kidney, artery, and eye diseases  You also lower your risk for nerve damage  Nerve damage can lead to amputations, poor vision, and blindness  · You improve your body's ability to heal and to fight infections  For more information and support to stop smoking:   · [employer]  Phone: 3- 614 - 998-1165  Web Address: [employer]  Narcotic (Opioid) Safety    Use narcotics safely:  · Take prescribed narcotics exactly as directed  · Do not give narcotics to others or take narcotics that belong to someone else  · Do not mix narcotics without medicines or alcohol  · Do not drive or operate heavy machinery after you take the narcotic  · Monitor for side effects and notify your healthcare provider if you experienced side effects such as nausea, sleepiness, itching, or trouble thinking clearly  Manage constipation:    Constipation is the most common side effect of narcotic medicine  Constipation is when you have hard, dry bowel movements, or you go longer than usual between bowel movements  Tell your healthcare provider about all changes in your bowel movements while you are taking narcotics   He or she may recommend laxative medicine to help you have a bowel movement  He or she may also change the kind of narcotic you are taking, or change when you take it  The following are more ways you can prevent or relieve constipation:    · Drink liquids as directed  You may need to drink extra liquids to help soften and move your bowels  Ask how much liquid to drink each day and which liquids are best for you  · Eat high-fiber foods  This may help decrease constipation by adding bulk to your bowel movements  High-fiber foods include fruits, vegetables, whole-grain breads and cereals, and beans  Your healthcare provider or dietitian can help you create a high-fiber meal plan  Your provider may also recommend a fiber supplement if you cannot get enough fiber from food  · Exercise regularly  Regular physical activity can help stimulate your intestines  Walking is a good exercise to prevent or relieve constipation  Ask which exercises are best for you  · Schedule a time each day to have a bowel movement  This may help train your body to have regular bowel movements  Bend forward while you are on the toilet to help move the bowel movement out  Sit on the toilet for at least 10 minutes, even if you do not have a bowel movement  Store narcotics safely:   · Store narcotics where others cannot easily get them  Keep them in a locked cabinet or secure area  Do not  keep them in a purse or other bag you carry with you  A person may be looking for something else and find the narcotics  · Make sure narcotics are stored out of the reach of children  A child can easily overdose on narcotics  Narcotics may look like candy to a small child  The best way to dispose of narcotics: The laws vary by country and area  In the [place], the best way is to return the narcotics through a take-back program  This program is offered by the [place])  The following are options for using the program:  · Take the narcotics to a BERNABE collection site    The site is often a law enforcement center  Call your local law enforcement center for scheduled take-back days in your area  You will be given information on where to go if the collection site is in a different location  · Take the narcotics to an approved pharmacy or hospital   A pharmacy or hospital may be set up as a collection site  You will need to ask if it is a BERNABE collection site if you were not directed there  A pharmacy or doctor's office may not be able to take back narcotics unless it is a BERNABE site  · Use a mail-back system  This means you are given containers to put the narcotics into  You will then mail them in the containers  · Use a take-back drop box  This is a place to leave the narcotics at any time  People and animals will not be able to get into the box  Your local law enforcement agency can tell you where to find a drop box in your area  Other ways to manage pain:   · Ask your healthcare provider about non-narcotic medicines to control pain  Nonprescription medicines include NSAIDs (such as ibuprofen) and acetaminophen  Prescription medicines include muscle relaxers, antidepressants, and steroids  · Pain may be managed without any medicines  Some ways to relieve pain include massage, aromatherapy, or meditation  Physical or occupational therapy may also help  For more information:   · Drug Enforcement Administration  [facility] [facility] [address]  Phone: 3- 339 - [phone]  Web Address: [facility]/drug_disposal/    · [address]  and Drug Administration  [place] [facility] , [address] [place]  Phone: 6- 207 - 889-8870  Web Address: http://[employer]/     © Copyright [employer] 2018 Information is for End User's use only and may not be sold, redistributed or otherwise used for commercial purposes   All illustrations and images included in CareNotes® are the copyrighted property of [employer] , [employer]  or [facility] Visit Patient Instructions  Thank you for completing your Welcome to Medicare Visit or Medicare Annual Wellness Visit today  Your next wellness visit will be due in one year (8/10/2022)  The screening/preventive services that you may require over the next 5-10 years are detailed below  Some tests may not apply to you based off risk factors and/or age  Screening tests ordered at today's visit but not completed yet may show as past due  Also, please note that scanned in results may not display below  Preventive Screenings:  Service Recommendations Previous Testing/Comments   Colorectal Cancer Screening  * Colonoscopy    * Fecal Occult Blood Test (FOBT)/Fecal Immunochemical Test (FIT)  * Fecal DNA/Cologuard Test  * Flexible Sigmoidoscopy Age: 54-65 years old   Colonoscopy: every 10 years (may be performed more frequently if at higher risk)  OR  FOBT/FIT: every 1 year  OR  Cologuard: every 3 years  OR  Sigmoidoscopy: every 5 years  Screening may be recommended earlier than age 48 if at higher risk for colorectal cancer  Also, an individualized decision between you and your healthcare provider will decide whether screening between the ages of 74-80 would be appropriate  Colonoscopy: 06/07/2010  FOBT/FIT: Not on file  Cologuard: Not on file  Sigmoidoscopy: Not on file          Breast Cancer Screening Age: 36 years old  Frequency: every 1-2 years  Not required if history of left and right mastectomy Mammogram: Not on file        Cervical Cancer Screening Between the ages of 21-29, pap smear recommended once every 3 years  Between the ages of 33-67, can perform pap smear with HPV co-testing every 5 years     Recommendations may differ for women with a history of total hysterectomy, cervical cancer, or abnormal pap smears in past  Pap Smear: Not on file    Screening Not Indicated   Hepatitis C Screening Once for adults born between [facility]  More frequently in patients at high risk for Hepatitis C Hep C Antibody: Not on file        Diabetes Screening 1-2 times per year if you're at risk for diabetes or have pre-diabetes Fasting glucose: 130 mg/dL   A1C: 5 9 %    Screening Current   Cholesterol Screening Once every 5 years if you don't have a lipid disorder  May order more often based on risk factors  Lipid panel: 02/16/2021    Screening Not Indicated  History Lipid Disorder     Other Preventive Screenings Covered by Medicare:  6  Abdominal Aortic Aneurysm (AAA) Screening: covered once if your at risk  You're considered to be at risk if you have a family history of AAA  7  Lung Cancer Screening: covers low dose CT scan once per year if you meet all of the following conditions: (1) Age 50-69; (2) No signs or symptoms of lung cancer; (3) Current smoker or have quit smoking within the last 15 years; (4) You have a tobacco smoking history of at least 30 pack years (packs per day multiplied by number of years you smoked); (5) You get a written order from a healthcare provider  8  Glaucoma Screening: covered annually if you're considered high risk: (1) You have diabetes OR (2) Family history of glaucoma OR (3)  aged 48 and older OR (3)  American aged 72 and older  5  Osteoporosis Screening: covered every 2 years if you meet one of the following conditions: (1) You're estrogen deficient and at risk for osteoporosis based off medical history and other findings; (2) Have a vertebral abnormality; (3) On glucocorticoid therapy for more than 3 months; (4) Have primary hyperparathyroidism; (5) On osteoporosis medications and need to assess response to drug therapy  · Last bone density test (DXA Scan): 04/09/2018  10  HIV Screening: covered annually if you're between the age of 12-76  Also covered annually if you are younger than 13 and older than 72 with risk factors for HIV infection  For pregnant patients, it is covered up to 3 times per pregnancy      Immunizations:  Immunization Recommendations   Influenza Vaccine Annual influenza vaccination during flu season is recommended for all persons aged >= 6 months who do not have contraindications   Pneumococcal Vaccine (Prevnar and Pneumovax)  * Prevnar = PCV13  * Pneumovax = PPSV23   Adults 25-60 years old: 1-3 doses may be recommended based on certain risk factors  Adults 72 years old: Prevnar (PCV13) vaccine recommended followed by Pneumovax (PPSV23) vaccine  If already received PPSV23 since turning 65, then PCV13 recommended at least one year after PPSV23 dose  Hepatitis B Vaccine 3 dose series if at intermediate or high risk (ex: diabetes, end stage renal disease, liver disease)   Tetanus (Td) Vaccine - COST NOT COVERED BY MEDICARE PART B Following completion of primary series, a booster dose should be given every 10 years to maintain immunity against tetanus  Td may also be given as tetanus wound prophylaxis  Tdap Vaccine - COST NOT COVERED BY MEDICARE PART B Recommended at least once for all adults  For pregnant patients, recommended with each pregnancy  Shingles Vaccine (Shingrix) - COST NOT COVERED BY MEDICARE PART B  2 shot series recommended in those aged 48 and above     Health Maintenance Due:      Topic Date Due    Hepatitis C Screening  Never done     Immunizations Due:      Topic Date Due    Influenza Vaccine (1) 09/01/2021     Advance Directives   What are advance directives? Advance directives are legal documents that state your wishes and plans for medical care  These plans are made ahead of time in case you lose your ability to make decisions for yourself  Advance directives can apply to any medical decision, such as the treatments you want, and if you want to donate organs  What are the types of advance directives? There are many types of advance directives, and each state has rules about how to use them  You may choose a combination of any of the following:  · Living will: This is a written record of the treatment you want   You can also choose which treatments you do not want, which to limit, and which to stop at a certain time  This includes surgery, medicine, IV fluid, and tube feedings  · Durable power of  for healthcare [place] [facility]): This is a written record that states who you want to make healthcare choices for you when you are unable to make them for yourself  This person, called a proxy, is usually a family member or a friend  You may choose more than 1 proxy  · Do not resuscitate (DNR) order:  A DNR order is used in case your heart stops beating or you stop breathing  It is a request not to have certain forms of treatment, such as CPR  A DNR order may be included in other types of advance directives  · Medical directive: This covers the care that you want if you are in a coma, near death, or unable to make decisions for yourself  You can list the treatments you want for each condition  Treatment may include pain medicine, surgery, blood transfusions, dialysis, IV or tube feedings, and a ventilator (breathing machine)  · Values history: This document has questions about your views, beliefs, and how you feel and think about life  This information can help others choose the care that you would choose  Why are advance directives important? An advance directive helps you control your care  Although spoken wishes may be used, it is better to have your wishes written down  Spoken wishes can be misunderstood, or not followed  Treatments may be given even if you do not want them  An advance directive may make it easier for your family to make difficult choices about your care  Cigarette Smoking and Your Health   Risks to your health if you smoke:  Nicotine and other chemicals found in tobacco damage every cell in your body  Even if you are a light smoker, you have an increased risk for cancer, heart disease, and lung disease  If you are pregnant or have diabetes, smoking increases your risk for complications     Benefits to your health if you stop smoking:   · You decrease respiratory symptoms such as coughing, wheezing, and shortness of breath  · You reduce your risk for cancers of the lung, mouth, throat, kidney, bladder, pancreas, stomach, and cervix  If you already have cancer, you increase the benefits of chemotherapy  You also reduce your risk for cancer returning or a second cancer from developing  · You reduce your risk for heart disease, blood clots, heart attack, and stroke  · You reduce your risk for lung infections, and diseases such as pneumonia, asthma, chronic bronchitis, and emphysema  · Your circulation improves  More oxygen can be delivered to your body  If you have diabetes, you lower your risk for complications, such as kidney, artery, and eye diseases  You also lower your risk for nerve damage  Nerve damage can lead to amputations, poor vision, and blindness  · You improve your body's ability to heal and to fight infections  For more information and support to stop smoking:   · [employer]  Phone: 9- 476 - 942-6526  Web Address: [employer]  Narcotic (Opioid) Safety    Use narcotics safely:  · Take prescribed narcotics exactly as directed  · Do not give narcotics to others or take narcotics that belong to someone else  · Do not mix narcotics without medicines or alcohol  · Do not drive or operate heavy machinery after you take the narcotic  · Monitor for side effects and notify your healthcare provider if you experienced side effects such as nausea, sleepiness, itching, or trouble thinking clearly  Manage constipation:    Constipation is the most common side effect of narcotic medicine  Constipation is when you have hard, dry bowel movements, or you go longer than usual between bowel movements  Tell your healthcare provider about all changes in your bowel movements while you are taking narcotics  He or she may recommend laxative medicine to help you have a bowel movement   He or she may also change the kind of narcotic you are taking, or change when you take it  The following are more ways you can prevent or relieve constipation:    · Drink liquids as directed  You may need to drink extra liquids to help soften and move your bowels  Ask how much liquid to drink each day and which liquids are best for you  · Eat high-fiber foods  This may help decrease constipation by adding bulk to your bowel movements  High-fiber foods include fruits, vegetables, whole-grain breads and cereals, and beans  Your healthcare provider or dietitian can help you create a high-fiber meal plan  Your provider may also recommend a fiber supplement if you cannot get enough fiber from food  · Exercise regularly  Regular physical activity can help stimulate your intestines  Walking is a good exercise to prevent or relieve constipation  Ask which exercises are best for you  · Schedule a time each day to have a bowel movement  This may help train your body to have regular bowel movements  Bend forward while you are on the toilet to help move the bowel movement out  Sit on the toilet for at least 10 minutes, even if you do not have a bowel movement  Store narcotics safely:   · Store narcotics where others cannot easily get them  Keep them in a locked cabinet or secure area  Do not  keep them in a purse or other bag you carry with you  A person may be looking for something else and find the narcotics  · Make sure narcotics are stored out of the reach of children  A child can easily overdose on narcotics  Narcotics may look like candy to a small child  The best way to dispose of narcotics: The laws vary by country and area  In the [place], the best way is to return the narcotics through a take-back program  This program is offered by the [employer]  The following are options for using the program:  · Take the narcotics to a BERNABE collection site  The site is often a law enforcement center   Call your local law enforcement center for scheduled take-back days in your area  You will be given information on where to go if the collection site is in a different location  · Take the narcotics to an approved pharmacy or hospital   A pharmacy or hospital may be set up as a collection site  You will need to ask if it is a BERNABE collection site if you were not directed there  A pharmacy or doctor's office may not be able to take back narcotics unless it is a BERNABE site  · Use a mail-back system  This means you are given containers to put the narcotics into  You will then mail them in the containers  · Use a take-back drop box  This is a place to leave the narcotics at any time  People and animals will not be able to get into the box  Your local law enforcement agency can tell you where to find a drop box in your area  Other ways to manage pain:   · Ask your healthcare provider about non-narcotic medicines to control pain  Nonprescription medicines include NSAIDs (such as ibuprofen) and acetaminophen  Prescription medicines include muscle relaxers, antidepressants, and steroids  · Pain may be managed without any medicines  Some ways to relieve pain include massage, aromatherapy, or meditation  Physical or occupational therapy may also help  For more information:   · Drug Enforcement Administration  [address] [place] [address] [address]  Phone: [phone]  Web Address: [facility]/drug_[email or web address]    · [address] and Drug Administration  [facility] , [address] [facility]  Phone: 3- [phone]  Web Address: http://[employer]/     © Copyright [employer] 2018 Information is for End User's use only and may not be sold, redistributed or otherwise used for commercial purposes   All illustrations and images included in CareNotes® are the copyrighted property of [employer]  or [address] [place] [address] [employer]

## 2025-02-24 NOTE — PLAN
[FreeTextEntry1] : - earlier endo appointment, pt taking his insulin as prescribed  - pain management appt in early march - IM appt in March - will get community SW connected to pt

## 2025-02-24 NOTE — REVIEW OF SYSTEMS
[Fever] : no fever [Chills] : no chills [Pain] : no pain [Hearing Loss] : no hearing loss [Chest Pain] : no chest pain [Shortness Of Breath] : no shortness of breath [Abdominal Pain] : no abdominal pain [Nausea] : no nausea [Dysuria] : no dysuria

## 2025-02-24 NOTE — PHYSICAL EXAM
[No Acute Distress] : no acute distress [No Respiratory Distress] : no respiratory distress  [04976 - Moderate Complexity requires multiple possible diagnoses and/or the management options, moderate complexity of the medical data (tests, etc.) to be reviewed, and moderate risk of significant complications, morbidity, and/or mortality as well as co] : Moderate Complexity

## 2025-03-06 NOTE — PROCEDURE
[FreeTextEntry1] : BILATERAL L4-L5 transforaminal epidural steroid injection [FreeTextEntry2] : chronic lumbar radiculopathy   [FreeTextEntry3] : Procedure Date:03/06/2025  Preoperative Diagnosis: chronic lumbar radiculopathy  Procedure: BILATERAL L4-L5 transforaminal epidural steroid injection under fluoroscopic guidance  Anesthesia: local  Complications: none  EBL: none  Procedure in detail: Patient was seen and examined. Risks, benefits, and alternatives for the procedure were discussed with the patient in detail. The patient expressed understanding, gave written and verbal consent, and placed themselves in a prone position on the procedure table. Skin overlying the lumbosacral spine was prepped with chloraprep and draped in the usual sterile fashion. Fluoroscopic images were obtained to identify the L4 vertebral body. Target was the 6 o'clock position under the RIGHT and LEFT pedicle at the L4 vertebral level. Skin overlying the target was marked and infiltrated with 1% lidocaine. Using two 22 gauge, 3.5 inch spinal needles, these were inserted and advanced under intermittent fluoroscopic guidance. When felt to be engaged in the epidural space, lateral view was used to confirm depth. After negative aspiration for heme/csf, contrast was injected under live fluoroscopy, which showed contrast spread consistent with epidural flow. No evidence of intravascular or intrathecal uptake. At this point, a total of 2ml of injectate, which consisted of 1ml of 6mg/ml betamethasone and 1ml of normal saline were injected through each needle. The needles were restyletted and withdrawn, a band aid was placed over the injection site. Patient tolerated the procedure well. The patient recovered uneventfully and was discharged home in stable condition.

## 2025-03-10 NOTE — END OF VISIT
[] : Resident [FreeTextEntry3] : on two agents for hypotension possibly in setting of autonomic dysfunction from diabetes; will simplify regimen for now continue to monitor for possible downtitration/discontinuation of midodrine in the future.

## 2025-03-10 NOTE — HISTORY OF PRESENT ILLNESS
[FreeTextEntry2] : 50yoM with pmhx of T1DM/MAL, peripheral neuropathy, HTN, and hypogonadism comes to the clinic for care following his hospitalization. He visited [facility] in Feb'25 for nausea/vomiting and was admitted for hyperglycemia (not DKA). He was seen by endocrinology and was discharged on a regimen of 24U lantus and 4U admelog TID. He was also noted to be experiencing orthostatic hypotension and was discharged on midodrine 10TID and droxidopa 100TID. Since his discharge, he has been taking 30U lantus in the morning and 5U premeal TID as he thought that was the recommended discharge regimen. He has also been taking his midodrine and droxidopa TID. His FSL readings have been average 220s in the past 30 days and he has been above range >80% of the time. Of note, he visited pain management 3/6/25 and underwent an epidural and after his blood pressure decreased to 80/40s s/p 400cc bolus with improvement to -110s. He has no acute complaints.

## 2025-03-10 NOTE — REVIEW OF SYSTEMS
[Joint Pain] : joint pain [Fever] : no fever [Chest Pain] : no chest pain [Shortness Of Breath] : no shortness of breath [Abdominal Pain] : no abdominal pain

## 2025-03-10 NOTE — ASSESSMENT
[FreeTextEntry1] :   50yoM with pmhx of T1DM/MAL, peripheral neuropathy, HTN, and hypogonadism comes to the clinic for care following his hospitalization.   RTC   1mo with PCP  CDW Dr Franco Sanchez, PGY2 [employer]

## 2025-03-10 NOTE — HISTORY OF PRESENT ILLNESS
[FreeTextEntry2] : 50yoM with pmhx of T1DM/MAL, peripheral neuropathy, HTN, and hypogonadism comes to the clinic for care following his hospitalization. He visited [facility] in Feb'25 for nausea/vomiting and was admitted for hyperglycemia (not DKA). He was seen by endocrinology and was discharged on a regimen of 24U lantus and 4U admelog TID. He was also noted to be experiencing orthostatic hypotension and was discharged on midodrine 10TID and droxidopa 100TID. Since his discharge, he has been taking 30U lantus in the morning and 5U premeal TID as he thought that was the recommended discharge regimen. He has also been taking his midodrine and droxidopa TID. His FSL readings have been average 220s in the past 30 days and he has been above range >80% of the time. Of note, he visited pain management 3/6/25 and underwent an epidural and after his blood pressure decreased to 80/40s s/p 400cc bolus with improvement to -110s. He has no acute complaints.

## 2025-03-20 NOTE — HISTORY OF PRESENT ILLNESS
[Lower back] : lower back [Buttock] : buttock [Left Leg] : left leg [Right Leg] : right leg [10] : 10 [Burning] : burning [Dull/Aching] : dull/aching [Radiating] : radiating [Shooting] : shooting [Stabbing] : stabbing [Throbbing] : throbbing [Tingling] : tingling [Constant] : constant [Household chores] : household chores [Leisure] : leisure [Sleep] : sleep [Nothing helps with pain getting better] : Nothing helps with pain getting better [Sitting] : sitting [Standing] : standing [Walking] : walking [Stairs] : stairs [FreeTextEntry1] : 3/20/2025 PATRICIA HANNA is a 50 year-old man presenting for a RPV for a history of chronic low back pain. On 3/6/2025, the patient underwent a BILATERAL L4-L5 TFESI w/o MAC. He notes modest improvement of pain since the procedure. Patient continues to have intermittent heaviness, numbness in the lower extremities including the legs and thighs. Patient also continues to have pain in the hands, elbows.  The patient states that average pain over the past week was 10/10 in severity. Mood: Patient has occasional depression, no anxiety.  Sleep: Patient notes difficulty with sleep initiation and maintenance 2/2 pain.   1/27/2025 PATRICIA HANNA is a 50 year-old man presenting for a RPV for a history of chronic low back pain. Patient underwent a TPI at last visit, which did not help significantly with his pain. The patient notes having ongoing pain in the low back w/ radiation to the bilateral posterior thighs and legs. Notes tingling and numbness. Notes having difficulty with his gait.  The patient states that average pain over the past week was 10/10 in severity. Mood: Patient has occasional depression, no anxiety.  Sleep: Patient notes difficulty with sleep initiation and maintenance 2/2 pain.   11/25/2024: PATRICIA HANNA is a 50 year-old man presenting for a NPV for a history of chronic low back pain. Patient notes a 2-3 year history of pain in the low back pain. He has never had this pain treated previously. Patient describes an aching pain across the low back w/ radiation to the bilateral lower extremities. Notes intermittent tingling and numbness. No weakness.  The patient states that average pain over the past week was 10/10 in severity. Mood: Patient has occasional depression, no anxiety.  Sleep: Patient notes difficulty with sleep initiation and maintenance 2/2 pain.  [] : no [FreeTextEntry6] : numbness and tingling in bilateral hands, legs and feet  [FreeTextEntry7] : Bialateral hands, feet and legs  [de-identified] : Gabapentin 400mg did not help [TWNoteComboBox1] : 0%

## 2025-03-20 NOTE — DISCUSSION/SUMMARY
[de-identified] : PATRICIA HANNA is a 50 year-old man presenting for a RPV for a history of chronic low back pain.   Prior treatment: 3/6/2025 BILATERAL L4-L5 TFESI w/o MAC w/ modest improvement of pain  Gabapentin Pregabalin Acetaminophen  OTC NSAIDs Prescribed home exercise regimen 11/2024 - 1/2025 TPI (11/25/2024 w/ steroid)  Plan: 1) CT lumbar spine images reviewed with the patient. 2) Continue home exercise regimen 3) Consider obtaining MRI lumbar spine w/o contrast 4) Emphasized the importance of better glucose control - states that his last A1c was ~10 5) Increase pregabalin to 100mg TID; Discussed AEs, including sedation, dizziness, somnolence, depression. Patient told to use caution when driving or working after taking the first few doses. 6) Continue cyclobenzaprine 5mg BID prn; denies AEs 7) RTC 6 weeks

## 2025-03-20 NOTE — HISTORY OF PRESENT ILLNESS
[Lower back] : lower back [Buttock] : buttock [Left Leg] : left leg [Right Leg] : right leg [10] : 10 [Burning] : burning [Dull/Aching] : dull/aching [Radiating] : radiating [Shooting] : shooting [Stabbing] : stabbing [Throbbing] : throbbing [Tingling] : tingling [Constant] : constant [Household chores] : household chores [Leisure] : leisure [Sleep] : sleep [Nothing helps with pain getting better] : Nothing helps with pain getting better [Sitting] : sitting [Standing] : standing [Walking] : walking [Stairs] : stairs [FreeTextEntry1] : 3/20/2025 PATRICIA HANNA is a 50 year-old man presenting for a RPV for a history of chronic low back pain. On 3/6/2025, the patient underwent a BILATERAL L4-L5 TFESI w/o MAC. He notes modest improvement of pain since the procedure. Patient continues to have intermittent heaviness, numbness in the lower extremities including the legs and thighs. Patient also continues to have pain in the hands, elbows.  The patient states that average pain over the past week was 10/10 in severity. Mood: Patient has occasional depression, no anxiety.  Sleep: Patient notes difficulty with sleep initiation and maintenance 2/2 pain.   1/27/2025 PATRICIA HANNA is a 50 year-old man presenting for a RPV for a history of chronic low back pain. Patient underwent a TPI at last visit, which did not help significantly with his pain. The patient notes having ongoing pain in the low back w/ radiation to the bilateral posterior thighs and legs. Notes tingling and numbness. Notes having difficulty with his gait.  The patient states that average pain over the past week was 10/10 in severity. Mood: Patient has occasional depression, no anxiety.  Sleep: Patient notes difficulty with sleep initiation and maintenance 2/2 pain.   11/25/2024: PATRICIA HANNA is a 50 year-old man presenting for a NPV for a history of chronic low back pain. Patient notes a 2-3 year history of pain in the low back pain. He has never had this pain treated previously. Patient describes an aching pain across the low back w/ radiation to the bilateral lower extremities. Notes intermittent tingling and numbness. No weakness.  The patient states that average pain over the past week was 10/10 in severity. Mood: Patient has occasional depression, no anxiety.  Sleep: Patient notes difficulty with sleep initiation and maintenance 2/2 pain.  [] : no [FreeTextEntry6] : numbness and tingling in bilateral hands, legs and feet  [FreeTextEntry7] : Bialateral hands, feet and legs  [de-identified] : Gabapentin 400mg did not help [TWNoteComboBox1] : 0%

## 2025-03-20 NOTE — DATA REVIEWED
[CT Scan] : CT scan [Lumbar Spine] : lumbar spine [Report was reviewed and noted in the chart] : The report was reviewed and noted in the chart [I independently reviewed and interpreted images and report] : I independently reviewed and interpreted images and report [FreeTextEntry1] : 10/2/2024 CT Lumbar Spine ([facility]) INTERPRETATION: Clinical indications: Back pain.  Multiple axial sections were performed through the lumbar spine. Coronal and sagittal reconstructions were performed  Loss of the normal cervical lordosis is seen  The vertebral body height and alignment appear normal.  No acute fractures or dislocations are identified  T11-12: Normal  T12-L1: Normal  L1-2: Normal  L2-3: Normal  L3-4: Mild disc bulge is seen without significant compromise of the spinal canal or either neural foramen  L4-5: Disc bulge and bilateral hypertrophic facet joint changes. Mild narrowing of both neural foramen  Sacralization of L5 is seen.  L5-S1: Normal  Evaluation of the paraspinal soft tissues is limited by lack of IV contrast though grossly normal  Dilated bladder is identified. Please correlate clinically  IMPRESSION: Mild degenerative changes as described above.

## 2025-03-20 NOTE — PHYSICAL EXAM
[de-identified] : Gen: NAD Head: NC/AT Eyes: no glasses, no scleral icterus ENT: mucous membranes moist CV: No JVD Lungs: nonlabored breathing Abd: soft, NT/ND Ext: full ROM in all extremities, no peripheral edema Back: +TTPI in the lumbar paraspinal region, +SLR bilaterally Neuro: CN intact LEs +5 L +5 R hip flexion +5 L +5 R leg extension +5 L +5 R leg flexion +5 L +5 R foot dorsiflexion +5 L +5 R foot plantarflexion +5 L +5 R EHL extension Psych: normal affect Skin: no visible lesions

## 2025-03-20 NOTE — DISCUSSION/SUMMARY
[de-identified] : PATRICIA HANNA is a 50 year-old man presenting for a RPV for a history of chronic low back pain.   Prior treatment: 3/6/2025 BILATERAL L4-L5 TFESI w/o MAC w/ modest improvement of pain  Gabapentin Pregabalin Acetaminophen  OTC NSAIDs Prescribed home exercise regimen 11/2024 - 1/2025 TPI (11/25/2024 w/ steroid)  Plan: 1) CT lumbar spine images reviewed with the patient. 2) Continue home exercise regimen 3) Consider obtaining MRI lumbar spine w/o contrast 4) Emphasized the importance of better glucose control - states that his last A1c was ~10 5) Increase pregabalin to 100mg TID; Discussed AEs, including sedation, dizziness, somnolence, depression. Patient told to use caution when driving or working after taking the first few doses. 6) Continue cyclobenzaprine 5mg BID prn; denies AEs 7) RTC 6 weeks

## 2025-03-20 NOTE — PHYSICAL EXAM
[de-identified] : Gen: NAD Head: NC/AT Eyes: no glasses, no scleral icterus ENT: mucous membranes moist CV: No JVD Lungs: nonlabored breathing Abd: soft, NT/ND Ext: full ROM in all extremities, no peripheral edema Back: +TTPI in the lumbar paraspinal region, +SLR bilaterally Neuro: CN intact LEs +5 L +5 R hip flexion +5 L +5 R leg extension +5 L +5 R leg flexion +5 L +5 R foot dorsiflexion +5 L +5 R foot plantarflexion +5 L +5 R EHL extension Psych: normal affect Skin: no visible lesions

## 2025-03-20 NOTE — DATA REVIEWED
[CT Scan] : CT scan [Lumbar Spine] : lumbar spine [Report was reviewed and noted in the chart] : The report was reviewed and noted in the chart [I independently reviewed and interpreted images and report] : I independently reviewed and interpreted images and report [FreeTextEntry1] : 10/2/2024 CT Lumbar Spine ([facility]) INTERPRETATION: Clinical indications: Back pain.  Multiple axial sections were performed through the lumbar spine. Coronal and sagittal reconstructions were performed  Loss of the normal cervical lordosis is seen  The vertebral body height and alignment appear normal.  No acute fractures or dislocations are identified  T11-12: Normal  T12-L1: Normal  L1-2: Normal  L2-3: Normal  L3-4: Mild disc bulge is seen without significant compromise of the spinal canal or either neural foramen  L4-5: Disc bulge and bilateral hypertrophic facet joint changes. Mild narrowing of both neural foramen  Sacralization of L5 is seen.  L5-S1: Normal  Evaluation of the paraspinal soft tissues is limited by lack of IV contrast though grossly normal  Dilated bladder is identified. Please correlate clinically  IMPRESSION: Mild degenerative changes as described above.

## 2025-04-11 NOTE — END OF VISIT
[] : Resident [FreeTextEntry3] : IDDM/MAL (low C peptide, +antibodies) with neuropathy, hypertension, chronic low back pain. a1c 14 in Oct 2024, now down to 10% diabetes diagnosed in 2018  multiple symptoms today; overall feeling overwhelmed. + dizziness which is positional + erectile dysfunction  notes multiple psychosocial stressors - poor social support in general; housing and financial concerns; SNAP benefits are not sufficient for him.  on physical exam - pt is in NAD; hands with evidence of interosseus atrophy with some clawing of the hands   1/ orthostatic hypotension diagnosed during recent hospital admission in Feb 2024; started on midodrine and droxidopa. LV we d/c'ed droxidopa due to tachycardia.  may be related to autonomic neuropathy from diabetes unclear med adherence, recommend to continue both midodrine (10tid) and droxidopa (100tid) for now  2/ concern for low testosterone - testosterone level checked by endo is normal; will f/u with endo - it seems a large part of pt's concern is ED, which to him signifies overall poor blood flow; and requesting something to help with blood flow; feels that veins in arms are not engorged enough as they used to be when he was working out and on testosterone - feels low energy overall  3/ likely ulnar neuropathy or diabetic neuropathy causing atrophy of small hand mm and mild claw deformity - to follow up with orthopedics - will need to examine further to try to understand if this is a local ulnar neuropathy or a manifestation of diabetic neuropathy  supportive counseling provided.  needs close and regular f/u with one consistent provider to address concerns sequentially and holistically - recommend a detailed social history and understanding pt's relationship with health care as there seems to be mistrust playing a role as well.

## 2025-04-11 NOTE — PHYSICAL EXAM
[No Acute Distress] : no acute distress [Normal Sclera/Conjunctiva] : normal sclera/conjunctiva [No Respiratory Distress] : no respiratory distress  [No Accessory Muscle Use] : no accessory muscle use [Clear to Auscultation] : lungs were clear to auscultation bilaterally [Regular Rhythm] : with a regular rhythm [No Edema] : there was no peripheral edema [Soft] : abdomen soft [Non Tender] : non-tender [Non-distended] : non-distended [Coordination Grossly Intact] : coordination grossly intact [de-identified] : Appears tired [de-identified] : Tachycardic [de-identified] : Slow, but steady gait

## 2025-04-11 NOTE — HISTORY OF PRESENT ILLNESS
[FreeTextEntry8] : Patient is a 49y/o male with h/o T1DM/MAL, peripheral neuropathy, HTN, hypogonadism, chronic back pain s/p bilateral L4-L5 TFESI w/o MAC (March 2025) presenting for acute visit for low BP and fainting.  Patient endorses numerous complaints of pain throughout entire body. Checks BP three times daily, normally 89/60-70s, feels lightheaded when pressures in this range. Since discharge from hospital for steroids, fainting and low pressures persisted, reports fainting several times a day. Last took droxidopa and midodrine 2 hours prior to visit. Reports adequate hydration, drinks 6 glasses water throughout the day.   Requests testosterone supplements for erectile dysfunction. Last took testosterone injections for 8 weeks in July of last year, but discontinued because of issues obtaining medical care (patient was working as , sometimes seeking care). Reports that he previously injected testosterone for body building, prescribed by physician, per patient. Says that he was also once prescribed a medication that increased his muscle mass, made his veins engorged, and gave him energy and improved his self-image. Asks if he can be prescribed the same today, but does not recall the name of this drug.

## 2025-04-11 NOTE — HISTORY OF PRESENT ILLNESS
[FreeTextEntry8] : Patient is a 51y/o male with h/o T1DM/MAL, peripheral neuropathy, HTN, hypogonadism, chronic back pain s/p bilateral L4-L5 TFESI w/o MAC (March 2025) presenting for acute visit for low BP and fainting.  Patient endorses numerous complaints of pain throughout entire body. Checks BP three times daily, normally 89/60-70s, feels lightheaded when pressures in this range. Since discharge from hospital for steroids, fainting and low pressures persisted, reports fainting several times a day. Last took droxidopa and midodrine 2 hours prior to visit. Reports adequate hydration, drinks 6 glasses water throughout the day.   Requests testosterone supplements for erectile dysfunction. Last took testosterone injections for 8 weeks in July of last year, but discontinued because of issues obtaining medical care (patient was working as , sometimes seeking care). Reports that he previously injected testosterone for body building, prescribed by physician, per patient. Says that he was also once prescribed a medication that increased his muscle mass, made his veins engorged, and gave him energy and improved his self-image. Asks if he can be prescribed the same today, but does not recall the name of this drug.

## 2025-04-11 NOTE — PHYSICAL EXAM
[No Acute Distress] : no acute distress [Normal Sclera/Conjunctiva] : normal sclera/conjunctiva [No Respiratory Distress] : no respiratory distress  [No Accessory Muscle Use] : no accessory muscle use [Clear to Auscultation] : lungs were clear to auscultation bilaterally [Regular Rhythm] : with a regular rhythm [No Edema] : there was no peripheral edema [Soft] : abdomen soft [Non Tender] : non-tender [Non-distended] : non-distended [Coordination Grossly Intact] : coordination grossly intact [de-identified] : Appears tired [de-identified] : Tachycardic [de-identified] : Slow, but steady gait

## 2025-04-11 NOTE — ASSESSMENT
[FreeTextEntry1] : Patient is a 49y/o male with h/o T1DM/MAL, peripheral neuropathy, HTN, hypogonadism, chronic back pain s/p bilateral L4-L5 TFESI w/o MAC (March 2025) presenting for acute visit for low BP and fainting.  #Hypotension #Orthostatic hypotension - Previously discont droxidopa d/t tachycardia - BPs in clinic today measured as 98/68 sitting, 87/58 standing - Restart droxidopa 100mg tid and continue to monitor pressures at home, do not take if sys BP >140 - Continue midodrine 10mg tid - Report to ED if BPs <90 sys or if symptoms worsen  #Neuropathy - Patient reports pain in all extremities, attributes his orthostatic hypotension to these issues - Considering surgical intervention for UE neuropathy - Has f/u appt with Ortho next week   #HCM - RTC in 2 weeks with PCP Dr. Lewis for f/u on hypotension
[FreeTextEntry1] : Patient is a 51y/o male with h/o T1DM/MAL, peripheral neuropathy, HTN, hypogonadism, chronic back pain s/p bilateral L4-L5 TFESI w/o MAC (March 2025) presenting for acute visit for low BP and fainting.  #Hypotension #Orthostatic hypotension - Previously discont droxidopa d/t tachycardia - BPs in clinic today measured as 98/68 sitting, 87/58 standing - Restart droxidopa 100mg tid and continue to monitor pressures at home, do not take if sys BP >140 - Continue midodrine 10mg tid - Report to ED if BPs <90 sys or if symptoms worsen  #Neuropathy - Patient reports pain in all extremities, attributes his orthostatic hypotension to these issues - Considering surgical intervention for UE neuropathy - Has f/u appt with Ortho next week   #HCM - RTC in 2 weeks with PCP Dr. Lewis for f/u on hypotension
Patient

## 2025-04-16 NOTE — HISTORY OF PRESENT ILLNESS
[FreeTextEntry1] : Patient is a 50 year old male with past medical history including hypogonadism, MAL/T1DM  Patient was hospitalized at [facility] in October 2024 with hyperglycemia. At that time, labs significant as follows: 10/2/24 c-peptide 0.4 (s. glu 229), NI Ab 1.12 (repeat 1.34 on 10/24/24), zinc transporter 8 ab elevated at 67, IA-2 negative, A1c 14.0% at that time.  LibreView 4/2-15/25 GMI 10.3% average glucose 294 time in range: very hgh 73%, high 18%, target range 9%, low 0%, very low 0% time CGM active 51%  #DM history: --Endocrine history: diagnosed with MAL in 2024, prior to that was treated as T2DM at times since 2018. Was having polyuria, polydipsia, weight loss, picked up on employment exam with [employer] initially. Started insulin therapy about a year ago.  --Current regimen: Lantus 24 units qhs, Humalog 15 units TIDAC. reports adherence but does express his opinion that he does not think lantus is very important.   --Medications tried in the past: was on non-insulin therapy for a few years.  --who administers medications: takes it himself --Home glucose log: see Benji documented above --Hypoglycemic episodes: How often? Symptoms? states that it happens often and then goes high. Wakes up from alarm sometimes. Has general symptoms like weakness, blurry vision, slurred speech. Uses glucose tablets 1-2 when glucose is low.  --How long on insulin: about a year since 2023. --Hx of DKA/HHS: no --Macrovascular complications: Denies MI or CVA --Last ophthalmologist visit: about 2 years --Retinopathy: none at the time of his last visit.  --Neuropathy: has neuropathy in feet and hands, takes Cymbalta and gabapentin, follows with podiatry --urine albumin/Cr Ratio: last 1/6/25 not elevated --Family history: mother with DM in her 80s treated with metformin.  --Outpatient Diet: turkey, chicken, fish, potatoes wedges. Tea with added sugar sometimes. Mostly water. --Exercise: limited due to neuropathy --Insurance: [facility] --Weight: 142 lbs --BMI: 21.59 --Statin: not currently on --ACE/ARB: not currently on --LDL: 72 (10/24/24) not on statin. --GFR:105 1/2025  #Hypogonadism history: Patient now discloses that he used to be on testosterone injection for body-building. Unclear if had true hx of hypogonadism. Discussed with patient that his testosterone level is within normal limits. He continues to complain of erectile dysfunction and fatigue and believes he needs testosterone.

## 2025-04-16 NOTE — HISTORY OF PRESENT ILLNESS
[FreeTextEntry1] : Patient is a 50 year old male with past medical history including hypogonadism, MAL/T1DM  Patient was hospitalized at [facility] in October 2024 with hyperglycemia. At that time, labs significant as follows: 10/2/24 c-peptide 0.4 (s. glu 229), NI Ab 1.12 (repeat 1.34 on 10/24/24), zinc transporter 8 ab elevated at 67, IA-2 negative, A1c 14.0% at that time.  LibreView 4/2-15/25 GMI 10.3% average glucose 294 time in range: very hgh 73%, high 18%, target range 9%, low 0%, very low 0% time CGM active 51%  #DM history: --Endocrine history: diagnosed with MAL in 2024, prior to that was treated as T2DM at times since 2018. Was having polyuria, polydipsia, weight loss, picked up on employment exam with [employer] initially. Started insulin therapy about a year ago.  --Current regimen: Lantus 24 units qhs, Humalog 15 units TIDAC. reports adherence but does express his opinion that he does not think lantus is very important.   --Medications tried in the past: was on non-insulin therapy for a few years.  --who administers medications: takes it himself --Home glucose log: see Benji documented above --Hypoglycemic episodes: How often? Symptoms? states that it happens often and then goes high. Wakes up from alarm sometimes. Has general symptoms like weakness, blurry vision, slurred speech. Uses glucose tablets 1-2 when glucose is low.  --How long on insulin: about a year since 2023. --Hx of DKA/HHS: no --Macrovascular complications: Denies MI or CVA --Last ophthalmologist visit: about 2 years --Retinopathy: none at the time of his last visit.  --Neuropathy: has neuropathy in feet and hands, takes Cymbalta and gabapentin, follows with podiatry --urine albumin/Cr Ratio: last 1/6/25 not elevated --Family history: mother with DM in her 80s treated with metformin.  --Outpatient Diet: turkey, chicken, fish, potatoes wedges. Tea with added sugar sometimes. Mostly water. --Exercise: limited due to neuropathy --Insurance: [facility] --Weight: 142 lbs --BMI: 21.59 --Statin: not currently on --ACE/ARB: not currently on --LDL: 72 (10/24/24) not on statin. --GFR:105 1/2025  #Hypogonadism history: Patient now discloses that he used to be on testosterone injection for body-building. Unclear if had true hx of hypogonadism. Discussed with patient that his testosterone level is within normal limits. He continues to complain of erectile dysfunction and fatigue and believes he needs testosterone.

## 2025-04-16 NOTE — REVIEW OF SYSTEMS
[As Noted in HPI] : as noted in HPI [Negative] : Heme/Lymph [Fatigue] : fatigue [Dizziness] : dizziness [de-identified] : +syncope

## 2025-04-16 NOTE — REVIEW OF SYSTEMS
[As Noted in HPI] : as noted in HPI [Negative] : Heme/Lymph [Fatigue] : fatigue [Dizziness] : dizziness [de-identified] : +syncope

## 2025-04-16 NOTE — ASSESSMENT
[FreeTextEntry1] : Patient is a 50 year old male who presents to the clinic for follow up of MAL.  #T1DM/MAL 10/2/24 c-peptide 0.4 (s. glu 229), NI Ab 1.12 (repeat 1.34 on 10/24/24) repeat Low c-peptide 0.7 with BG 250s 1/2025 A1c today 10.3% patient reports adherence to lantus 24 units qhs and humalog 15 units TIDAC but does express his opinion that he does not think lantus is very important He is very distracted today by other medical complaints with pinched nerves, hypotension and recent episodes of syncope, skin concerns  PLAN - Discussed with patient the option of insulin pump, he does not like the idea of carb counting. Not interested in other pumps but does express interest in Beta Bionic Pancreas. Estefani GONSALVES will reach out to rep. of note patient will need to upgrade to FL3+ for beta bionic. - For now, increase lantus to 30 units qhs and increase humalog to 18 units TIDAC - Ophtho not up to date, will give referral - Discussed hypoglycemia < 70mg/dL, risks, symptoms and management. - Discussed BG goals with patient, fasting 80-130mg/dL, premeal <140mg/dL, 2hours post prandial <180mg/dL - Counselled patient on lifestyle changes including diet modifications and exercise. - continue freestyle kimberly 3 sensors  #HLD - LDL 72, close to goal - not on statin, will hold off at this time due to many other complaints including pains everywhere due to pinched nerves - goal LDL in diabetes mellitus is <70  #hx of testosterone use for body building. Unclear if he had true hx of hypogonadism - discussed with patient that testosterone level 488 is in normal range. he continues to request testosterone and wants his testosterone level to be in 900s. When we discussed that there is no medical need for testosterone replacement, he requests to speak with his medical doctor who is not in the office at this time.  RTC in 3 months earlier if can arrange visit with Estefani for beta bionic pancreas insulin pump transition  Discussed with attending physician Dr. Pancho Rodriguez.  MO Rodriguez MD Endocrine fellow

## 2025-04-16 NOTE — ASSESSMENT
[FreeTextEntry1] : Patient is a 50 year old male who presents to the clinic for follow up of MAL.  #T1DM/MAL 10/2/24 c-peptide 0.4 (s. glu 229), NI Ab 1.12 (repeat 1.34 on 10/24/24) repeat Low c-peptide 0.7 with BG 250s 1/2025 A1c today 10.3% patient reports adherence to lantus 24 units qhs and humalog 15 units TIDAC but does express his opinion that he does not think lantus is very important He is very distracted today by other medical complaints with pinched nerves, hypotension and recent episodes of syncope, skin concerns  PLAN - Discussed with patient the option of insulin pump, he does not like the idea of carb counting. Not interested in other pumps but does express interest in Beta Bionic Pancreas. Estefani GONSALVES will reach out to rep. of note patient will need to upgrade to FL3+ for beta bionic. - For now, increase lantus to 30 units qhs and increase humalog to 18 units TIDAC - Ophtho not up to date, will give referral - Discussed hypoglycemia < 70mg/dL, risks, symptoms and management. - Discussed BG goals with patient, fasting 80-130mg/dL, premeal <140mg/dL, 2hours post prandial <180mg/dL - Counselled patient on lifestyle changes including diet modifications and exercise. - continue freestyle kimberly 3 sensors  #HLD - LDL 72, close to goal - not on statin, will hold off at this time due to many other complaints including pains everywhere due to pinched nerves - goal LDL in diabetes mellitus is <70  #hx of testosterone use for body building. Unclear if he had true hx of hypogonadism - discussed with patient that testosterone level 488 is in normal range. he continues to request testosterone and wants his testosterone level to be in 900s. When we discussed that there is no medical need for testosterone replacement, he requests to speak with his medical doctor who is not in the office at this time.  RTC in 3 months earlier if can arrange visit with sEtefani for beta bionic pancreas insulin pump transition  Discussed with attending physician Dr. Pancho Rodriguez.  MO Rodriguez MD Endocrine fellow

## 2025-04-16 NOTE — END OF VISIT
[] : Fellow [FreeTextEntry3] : Poorly controlled DM2 complicated by severe diabetic neuropathy along with spinal disc disease. CGM data shows BG above goal fasting and mealtime- will further titrate up as recommended above. For chronic management, pt would benefit from beta bionic pancreas pump- team will reach out to rep to start discussion. Patient with otherwise normal testosterone level and extensive discussion stating that erectile dysfunction is not due to low testosterone and supplement is not indicated. Patient may return to pcp for further assessment of other etiology/workup for erectile dysfunction. [Time Spent: ___ minutes] : I have spent [unfilled] minutes of time on the encounter which excludes teaching and separately reported services.

## 2025-04-23 NOTE — PHYSICAL EXAM
[No Acute Distress] : no acute distress [Well Nourished] : well nourished [Well Developed] : well developed [EOMI] : extraocular movements intact [No JVD] : no jugular venous distention [No Lymphadenopathy] : no lymphadenopathy [No Respiratory Distress] : no respiratory distress  [Normal Rate] : normal rate  [Regular Rhythm] : with a regular rhythm [Soft] : abdomen soft [Non Tender] : non-tender [No HSM] : no HSM [No Rash] : no rash [Coordination Grossly Intact] : coordination grossly intact [No Focal Deficits] : no focal deficits [Speech Grossly Normal] : speech grossly normal [Memory Grossly Normal] : memory grossly normal [Normal Affect] : the affect was normal [Normal Mood] : the mood was normal [Normal Insight/Judgement] : insight and judgment were intact [de-identified] : missing a few teeth

## 2025-04-23 NOTE — PHYSICAL EXAM
[No Acute Distress] : no acute distress [Well Nourished] : well nourished [Well Developed] : well developed [EOMI] : extraocular movements intact [No JVD] : no jugular venous distention [No Lymphadenopathy] : no lymphadenopathy [No Respiratory Distress] : no respiratory distress  [Normal Rate] : normal rate  [Regular Rhythm] : with a regular rhythm [Soft] : abdomen soft [Non Tender] : non-tender [No HSM] : no HSM [No Rash] : no rash [Coordination Grossly Intact] : coordination grossly intact [No Focal Deficits] : no focal deficits [Speech Grossly Normal] : speech grossly normal [Memory Grossly Normal] : memory grossly normal [Normal Affect] : the affect was normal [Normal Mood] : the mood was normal [Normal Insight/Judgement] : insight and judgment were intact [de-identified] : missing a few teeth

## 2025-04-23 NOTE — HISTORY OF PRESENT ILLNESS
[FreeTextEntry1] : Follow Up  [de-identified] : Jacek Rodas is a 49y/o male with PMH of T1DM/MAL, peripheral neuropathy, HTN, hypogonadism, chronic back pain s/p bilateral L4-L5 TFESI w/o MAC presenting for two week follow up for low BP and fainting. Pt recommended for midodrine 10 TID and Droxidopa 100 TID, pt has been taking these medications about two times a day. Pt had 6 falls in the last two wks, last monday to tuesday pt fell on their head went to [facility] [place], head imaging negative. Pt lost 8 teeth. Per pt they just pass out, could not give specfic history. Pt does not have LOC in these episodes.

## 2025-04-23 NOTE — HISTORY OF PRESENT ILLNESS
[FreeTextEntry1] : Follow Up  [de-identified] : Jacek Rodas is a 51y/o male with PMH of T1DM/MAL, peripheral neuropathy, HTN, hypogonadism, chronic back pain s/p bilateral L4-L5 TFESI w/o MAC presenting for two week follow up for low BP and fainting. Pt recommended for midodrine 10 TID and Droxidopa 100 TID, pt has been taking these medications about two times a day. Pt had 6 falls in the last two wks, last monday to tuesday pt fell on their head went to [facility] [place], head imaging negative. Pt lost 8 teeth. Per pt they just pass out, could not give specfic history. Pt does not have LOC in these episodes.

## 2025-05-01 NOTE — DISCUSSION/SUMMARY
[de-identified] : PATRICIA HANNA is a 50 year-old man presenting for a RPV for a history of chronic low back pain.   Prior treatment: 3/6/2025 BILATERAL L4-L5 TFESI w/o MAC w/ modest improvement of pain  Gabapentin Pregabalin Acetaminophen  OTC NSAIDs Prescribed home exercise regimen 11/2024 - 1/2025 TPI (11/25/2024 w/ steroid)  Plan: 1) CT lumbar spine images reviewed with the patient. 2) Continue home exercise regimen 3) Consider obtaining MRI lumbar spine w/o contrast 4) Emphasized the importance of better glucose control - states that his last A1c was ~9 5) Increase pregabalin to 200mg TID; Discussed AEs, including sedation, dizziness, somnolence, depression. Patient told to use caution when driving or working after taking the first few doses. 6) Continue cyclobenzaprine 5mg BID prn; denies AEs 7) Trial meloxicam 15mg daily prn; Discussed the risks of NSAIDs, including worsening HTN, cardiovascular risks, GI risk, renal injury. The patient was told not to take other NSAIDs with this and to consult with their PCP if there is a significant medical history to warrant this prior to initiating treatment. 8) RTC 6 weeks

## 2025-05-01 NOTE — HISTORY OF PRESENT ILLNESS
[Lower back] : lower back [Buttock] : buttock [Left Leg] : left leg [Right Leg] : right leg [10] : 10 [Burning] : burning [Dull/Aching] : dull/aching [Radiating] : radiating [Shooting] : shooting [Stabbing] : stabbing [Throbbing] : throbbing [Tingling] : tingling [Constant] : constant [Household chores] : household chores [Leisure] : leisure [Sleep] : sleep [Nothing helps with pain getting better] : Nothing helps with pain getting better [Sitting] : sitting [Standing] : standing [Walking] : walking [Stairs] : stairs [FreeTextEntry1] : 5/1/2025 PATRICIA HANNA is a 50 year-old man presenting for a RPV for a history of chronic neck and low back pain. Patient continues to have pain in the back w/ radiation to the lower extremities, including posterior thighs and legs into the feet. Notes tingling and numbness, no weakness. Also notes pain in the neck and upper extremities. The patient notes that pregabalin is not helping with pain.  The patient states that average pain over the past week was 10/10 in severity. Mood: Patient has occasional depression, no anxiety.  Sleep:  Patient notes difficulty with sleep initiation and maintenance 2/2 pain.   3/20/2025 PATRICIA HANNA is a 50 year-old man presenting for a RPV for a history of chronic low back pain. On 3/6/2025, the patient underwent a BILATERAL L4-L5 TFESI w/o MAC. He notes modest improvement of pain since the procedure. Patient continues to have intermittent heaviness, numbness in the lower extremities including the legs and thighs. Patient also continues to have pain in the hands, elbows.  The patient states that average pain over the past week was 10/10 in severity. Mood: Patient has occasional depression, no anxiety.  Sleep: Patient notes difficulty with sleep initiation and maintenance 2/2 pain.   1/27/2025 PATRICIA HANNA is a 50 year-old man presenting for a RPV for a history of chronic low back pain. Patient underwent a TPI at last visit, which did not help significantly with his pain. The patient notes having ongoing pain in the low back w/ radiation to the bilateral posterior thighs and legs. Notes tingling and numbness. Notes having difficulty with his gait.  The patient states that average pain over the past week was 10/10 in severity. Mood: Patient has occasional depression, no anxiety.  Sleep: Patient notes difficulty with sleep initiation and maintenance 2/2 pain.   11/25/2024: PATRICIA HANNA is a 50 year-old man presenting for a NPV for a history of chronic low back pain. Patient notes a 2-3 year history of pain in the low back pain. He has never had this pain treated previously. Patient describes an aching pain across the low back w/ radiation to the bilateral lower extremities. Notes intermittent tingling and numbness. No weakness.  The patient states that average pain over the past week was 10/10 in severity. Mood: Patient has occasional depression, no anxiety.  Sleep: Patient notes difficulty with sleep initiation and maintenance 2/2 pain.  [] : no [FreeTextEntry6] : numbness and tingling in bilateral hands, legs and feet  [FreeTextEntry7] : Bialateral hands, feet and legs  [de-identified] : Gabapentin 400mg did not help [TWNoteComboBox1] : 0%

## 2025-05-01 NOTE — PHYSICAL EXAM
[de-identified] : Gen: NAD Head: NC/AT Eyes: no glasses, no scleral icterus ENT: mucous membranes moist CV: No JVD Lungs: nonlabored breathing Abd: soft, NT/ND Ext: full ROM in all extremities, no peripheral edema Back: +TTPI in the lumbar paraspinal region, +SLR bilaterally Neuro: CN intact LEs +5 L +5 R hip flexion +5 L +5 R leg extension +5 L +5 R leg flexion +5 L +5 R foot dorsiflexion +5 L +5 R foot plantarflexion +5 L +5 R EHL extension Psych: normal affect Skin: no visible lesions

## 2025-05-01 NOTE — PHYSICAL EXAM
[de-identified] : Gen: NAD Head: NC/AT Eyes: no glasses, no scleral icterus ENT: mucous membranes moist CV: No JVD Lungs: nonlabored breathing Abd: soft, NT/ND Ext: full ROM in all extremities, no peripheral edema Back: +TTPI in the lumbar paraspinal region, +SLR bilaterally Neuro: CN intact LEs +5 L +5 R hip flexion +5 L +5 R leg extension +5 L +5 R leg flexion +5 L +5 R foot dorsiflexion +5 L +5 R foot plantarflexion +5 L +5 R EHL extension Psych: normal affect Skin: no visible lesions

## 2025-05-01 NOTE — DATA REVIEWED
[CT Scan] : CT scan [Lumbar Spine] : lumbar spine [Report was reviewed and noted in the chart] : The report was reviewed and noted in the chart [I independently reviewed and interpreted images and report] : I independently reviewed and interpreted images and report [FreeTextEntry1] : 10/2/2024 CT Lumbar Spine ([facility]) INTERPRETATION: Clinical indications: Back pain.  Multiple axial sections were performed through the lumbar spine. Coronal and sagittal reconstructions were performed  Loss of the normal cervical lordosis is seen  The vertebral body height and alignment appear normal.  No acute fractures or dislocations are identified  T11-12: Normal  T12-L1: Normal  L1-2: Normal  L2-3: Normal  L3-4: Mild disc bulge is seen without significant compromise of the spinal canal or either neural foramen  L4-5: Disc bulge and bilateral hypertrophic facet joint changes. Mild narrowing of both neural foramen  Sacralization of L5 is seen.  L5-S1: Normal  Evaluation of the paraspinal soft tissues is limited by lack of IV contrast though grossly normal  Dilated bladder is identified. Please correlate clinically  IMPRESSION: Mild degenerative changes as described above.

## 2025-05-01 NOTE — DISCUSSION/SUMMARY
[de-identified] : PATRICIA HANNA is a 50 year-old man presenting for a RPV for a history of chronic low back pain.   Prior treatment: 3/6/2025 BILATERAL L4-L5 TFESI w/o MAC w/ modest improvement of pain  Gabapentin Pregabalin Acetaminophen  OTC NSAIDs Prescribed home exercise regimen 11/2024 - 1/2025 TPI (11/25/2024 w/ steroid)  Plan: 1) CT lumbar spine images reviewed with the patient. 2) Continue home exercise regimen 3) Consider obtaining MRI lumbar spine w/o contrast 4) Emphasized the importance of better glucose control - states that his last A1c was ~9 5) Increase pregabalin to 200mg TID; Discussed AEs, including sedation, dizziness, somnolence, depression. Patient told to use caution when driving or working after taking the first few doses. 6) Continue cyclobenzaprine 5mg BID prn; denies AEs 7) Trial meloxicam 15mg daily prn; Discussed the risks of NSAIDs, including worsening HTN, cardiovascular risks, GI risk, renal injury. The patient was told not to take other NSAIDs with this and to consult with their PCP if there is a significant medical history to warrant this prior to initiating treatment. 8) RTC 6 weeks

## 2025-05-01 NOTE — DATA REVIEWED
[CT Scan] : CT scan [Lumbar Spine] : lumbar spine [Report was reviewed and noted in the chart] : The report was reviewed and noted in the chart [I independently reviewed and interpreted images and report] : I independently reviewed and interpreted images and report [FreeTextEntry1] : 10/2/2024 CT Lumbar Spine ([facility]) INTERPRETATION: Clinical indications: Back pain.  Multiple axial sections were performed through the lumbar spine. Coronal and sagittal reconstructions were performed  Loss of the normal cervical lordosis is seen  The vertebral body height and alignment appear normal.  No acute fractures or dislocations are identified  T11-12: Normal  T12-L1: Normal  L1-2: Normal  L2-3: Normal  L3-4: Mild disc bulge is seen without significant compromise of the spinal canal or either neural foramen  L4-5: Disc bulge and bilateral hypertrophic facet joint changes. Mild narrowing of both neural foramen  Sacralization of L5 is seen.  L5-S1: Normal  Evaluation of the paraspinal soft tissues is limited by lack of IV contrast though grossly normal  Dilated bladder is identified. Please correlate clinically  IMPRESSION: Mild degenerative changes as described above.

## 2025-05-01 NOTE — HISTORY OF PRESENT ILLNESS
[Lower back] : lower back [Buttock] : buttock [Left Leg] : left leg [Right Leg] : right leg [10] : 10 [Burning] : burning [Dull/Aching] : dull/aching [Radiating] : radiating [Shooting] : shooting [Stabbing] : stabbing [Throbbing] : throbbing [Tingling] : tingling [Constant] : constant [Household chores] : household chores [Leisure] : leisure [Sleep] : sleep [Nothing helps with pain getting better] : Nothing helps with pain getting better [Sitting] : sitting [Standing] : standing [Walking] : walking [Stairs] : stairs [FreeTextEntry1] : 5/1/2025 PATRICIA HANNA is a 50 year-old man presenting for a RPV for a history of chronic neck and low back pain. Patient continues to have pain in the back w/ radiation to the lower extremities, including posterior thighs and legs into the feet. Notes tingling and numbness, no weakness. Also notes pain in the neck and upper extremities. The patient notes that pregabalin is not helping with pain.  The patient states that average pain over the past week was 10/10 in severity. Mood: Patient has occasional depression, no anxiety.  Sleep:  Patient notes difficulty with sleep initiation and maintenance 2/2 pain.   3/20/2025 PATRICIA HANNA is a 50 year-old man presenting for a RPV for a history of chronic low back pain. On 3/6/2025, the patient underwent a BILATERAL L4-L5 TFESI w/o MAC. He notes modest improvement of pain since the procedure. Patient continues to have intermittent heaviness, numbness in the lower extremities including the legs and thighs. Patient also continues to have pain in the hands, elbows.  The patient states that average pain over the past week was 10/10 in severity. Mood: Patient has occasional depression, no anxiety.  Sleep: Patient notes difficulty with sleep initiation and maintenance 2/2 pain.   1/27/2025 PATRICIA HANNA is a 50 year-old man presenting for a RPV for a history of chronic low back pain. Patient underwent a TPI at last visit, which did not help significantly with his pain. The patient notes having ongoing pain in the low back w/ radiation to the bilateral posterior thighs and legs. Notes tingling and numbness. Notes having difficulty with his gait.  The patient states that average pain over the past week was 10/10 in severity. Mood: Patient has occasional depression, no anxiety.  Sleep: Patient notes difficulty with sleep initiation and maintenance 2/2 pain.   11/25/2024: PATRICIA HANNA is a 50 year-old man presenting for a NPV for a history of chronic low back pain. Patient notes a 2-3 year history of pain in the low back pain. He has never had this pain treated previously. Patient describes an aching pain across the low back w/ radiation to the bilateral lower extremities. Notes intermittent tingling and numbness. No weakness.  The patient states that average pain over the past week was 10/10 in severity. Mood: Patient has occasional depression, no anxiety.  Sleep: Patient notes difficulty with sleep initiation and maintenance 2/2 pain.  [] : no [FreeTextEntry6] : numbness and tingling in bilateral hands, legs and feet  [FreeTextEntry7] : Bialateral hands, feet and legs  [de-identified] : Gabapentin 400mg did not help [TWNoteComboBox1] : 0%

## 2025-05-05 NOTE — PHYSICAL EXAM
[FreeTextEntry3] : brown hyperpigmented macules and healing erosions on b/l forearms, dorsal hands, and shins R plantar thumb with deflating bulla

## 2025-05-05 NOTE — HISTORY OF PRESENT ILLNESS
[FreeTextEntry1] : NPV: "blisters" on hands and legs  [de-identified] : 05/02/2025   50yoM here for eval of:  #Reported Blisters on hands and legs x months patient endorses multiple painful blisters in these areas in the past several months. He thinks these are edema blisters. None are present on exam on 5/2. He does not have photos.  Leaves dark spots.   Patient reports frequent falls and syncope requiring frequent hospitalizations   PMH with HTN, DM1 with neuropathy  SH: currently lives in medical shelter in [place]

## 2025-05-05 NOTE — ASSESSMENT
[FreeTextEntry1] : #Scars and healing erosions Predominantly postinflammatory hyperpigmentation on exam on 05/02/2025 - no vesicular or bullous lesions definitively seen on exam 05/02/2025  in setting of reported history of recurrent bulla. Single nonspecific possible resolving bulla on R thumb today otherwise mostly healing scars on extremities today. No images of active rash available per patient --counselled to rtc should patient have active blistering and rash in the future --Dry gentle skin care encouraged as well as liberal application of bland emollients. Recommendations provided  RTC as above as needed

## 2025-05-05 NOTE — HISTORY OF PRESENT ILLNESS
[FreeTextEntry1] : NPV: "blisters" on hands and legs  [de-identified] : 05/02/2025   50yoM here for eval of:  #Reported Blisters on hands and legs x months patient endorses multiple painful blisters in these areas in the past several months. He thinks these are edema blisters. None are present on exam on 5/2. He does not have photos.  Leaves dark spots.   Patient reports frequent falls and syncope requiring frequent hospitalizations   PMH with HTN, DM1 with neuropathy  SH: currently lives in medical shelter in [place]

## 2025-05-08 NOTE — REVIEW OF SYSTEMS
[Fatigue] : fatigue [Negative] : Neurological [de-identified] : hyperpigmentation of creases on palms

## 2025-05-08 NOTE — HISTORY OF PRESENT ILLNESS
[de-identified] : 49y/o male with PMH of T1DM/MAL, peripheral neuropathy, HTN, hypogonadism, chronic back pain s/p bilateral L4-L5 TFESI w/o MAC presenting for two week follow up for hypotension/medication titration. Patient states that since that last appointment he has had one episode of hypotension with SBP 90s, usual SBPs at home are at 100s-120s, high was 120s. No syncopal episodes, has been compliant with meds.

## 2025-06-19 NOTE — HISTORY OF PRESENT ILLNESS
[Lower back] : lower back [Buttock] : buttock [Left Leg] : left leg [Right Leg] : right leg [10] : 10 [Burning] : burning [Dull/Aching] : dull/aching [Radiating] : radiating [Shooting] : shooting [Stabbing] : stabbing [Throbbing] : throbbing [Tingling] : tingling [Constant] : constant [Household chores] : household chores [Leisure] : leisure [Sleep] : sleep [Nothing helps with pain getting better] : Nothing helps with pain getting better [Sitting] : sitting [Standing] : standing [Walking] : walking [Stairs] : stairs [FreeTextEntry1] : 6/19/2025: PATRICIA HANNA is a 50 year-old man presenting for a RPV for a history of chronic neck and low back pain. Patient continues to have pain in the neck w/ radiation to both upper extremities in the arms, forearms, and hands. Notes tingling and numbness throughout. Also continues w/ low back pain w/ radiation to the bilateral posterior thighs and legs.  The patient states that average pain over the past week was 10/10 in severity. Mood: Patient has occasional depression, notes anxiety that is associated w/ insomnia. Is seeing a psychiatrist.  Sleep:  Patient notes difficulty with sleep initiation and maintenance 2/2 pain.   6/5/2025 PATRICIA HANNA is a 50 year-old man presenting for a RPV for a history of chronic neck and low back pain. Patient continues to have pain across the low back w/ radiation to the bilateral posterior thighs and legs. Has been taking pregabalin 200mg TID. Denies AEs. Patient also notes ongoing pain in the neck w/ radiation to the UEs with tingling and numbness in the hands.  The patient states that average pain over the past week was 10/10 in severity.  Mood: Patient has occasional depression, no anxiety.  Sleep:  Patient notes difficulty with sleep initiation and maintenance 2/2 pain.   5/1/2025 PATRICIA HANNA is a 50 year-old man presenting for a RPV for a history of chronic neck and low back pain. Patient continues to have pain in the back w/ radiation to the lower extremities, including posterior thighs and legs into the feet. Notes tingling and numbness, no weakness. Also notes pain in the neck and upper extremities. The patient notes that pregabalin is not helping with pain.  The patient states that average pain over the past week was 10/10 in severity. Mood: Patient has occasional depression, no anxiety.  Sleep:  Patient notes difficulty with sleep initiation and maintenance 2/2 pain.   3/20/2025 PATRICIA HANNA is a 50 year-old man presenting for a RPV for a history of chronic low back pain. On 3/6/2025, the patient underwent a BILATERAL L4-L5 TFESI w/o MAC. He notes modest improvement of pain since the procedure. Patient continues to have intermittent heaviness, numbness in the lowe06/05r extremities including the legs and thighs. Patient also continues to have pain in the hands, elbows.  The patient states that average pain over the past week was 10/10 in severity. Mood: Patient has occasional depression, no anxiety.  Sleep: Patient notes difficulty with sleep initiation and maintenance 2/2 pain.   1/27/2025 PATRICIA HANNA is a 50 year-old man presenting for a RPV for a history of chronic low back pain. Patient underwent a TPI at last visit, which did not help significantly with his pain. The patient notes having ongoing pain in the low back w/ radiation to the bilateral posterior thighs and legs. Notes tingling and numbness. Notes having difficulty with his gait.  The patient states that average pain over the past week was 10/10 in severity. Mood: Patient has occasional depression, no anxiety.  Sleep: Patient notes difficulty with sleep initiation and maintenance 2/2 pain.   11/25/2024: PATRICIA HANNA is a 50 year-old man presenting for a NPV for a history of chronic low back pain. Patient notes a 2-3 year history of pain in the low back pain. He has never had this pain treated previously. Patient describes an aching pain across the low back w/ radiation to the bilateral lower extremities. Notes intermittent tingling and numbness. No weakness.  The patient states that average pain over the past week was 10/10 in severity. Mood: Patient has occasional depression, no anxiety.  Sleep: Patient notes difficulty with sleep initiation and maintenance 2/2 pain.   [] : no [FreeTextEntry6] : numbness and tingling in bilateral hands, legs and feet  [FreeTextEntry7] : Bialateral hands, feet and legs  [de-identified] : Gabapentin 400mg did not help [TWNoteComboBox1] : 0%

## 2025-06-19 NOTE — DISCUSSION/SUMMARY
[de-identified] : PATRICIA HANNA is a 50 year-old man presenting for a RPV for a history of chronic low back pain.   Prior treatment: 3/6/2025 BILATERAL L4-L5 TFESI w/o MAC w/ modest improvement of pain  Gabapentin Pregabalin Acetaminophen  OTC NSAIDs Prescribed home exercise regimen 11/2024 - 1/2025 TPI (11/25/2024 w/ steroid)  Plan: 1) CT lumbar spine images reviewed with the patient. 2) MRI cervical spine images reviewed with the patient. 3) Schedule C7-T1 ODETTE w/o MAC. The procedure was explained to the patient in detail. Reviewed risks, benefits, and alternatives with the patient. Some risks discussed included temporary increase in pain, bleeding, infection, and side effects from steroids. The patient expressed understanding and would like to proceed. 4) MRI lumbar spine images reviewed with the patient. 5) Emphasized the importance of better glucose control - states that his last A1c was ~9 6) Continue pregabalin 200mg TID; denies AEs 7) Continue cyclobenzaprine 5mg BID prn; denies AEs 8) Continue meloxicam 15mg daily prn; denies AEs 9) RTC post procedure

## 2025-06-19 NOTE — DISCUSSION/SUMMARY
[de-identified] : PATRICIA HANNA is a 50 year-old man presenting for a RPV for a history of chronic low back pain.   Prior treatment: 3/6/2025 BILATERAL L4-L5 TFESI w/o MAC w/ modest improvement of pain  Gabapentin Pregabalin Acetaminophen  OTC NSAIDs Prescribed home exercise regimen 11/2024 - 1/2025 TPI (11/25/2024 w/ steroid)  Plan: 1) CT lumbar spine images reviewed with the patient. 2) MRI cervical spine images reviewed with the patient. 3) Schedule C7-T1 ODETTE w/o MAC. The procedure was explained to the patient in detail. Reviewed risks, benefits, and alternatives with the patient. Some risks discussed included temporary increase in pain, bleeding, infection, and side effects from steroids. The patient expressed understanding and would like to proceed. 4) MRI lumbar spine images reviewed with the patient. 5) Emphasized the importance of better glucose control - states that his last A1c was ~9 6) Continue pregabalin 200mg TID; denies AEs 7) Continue cyclobenzaprine 5mg BID prn; denies AEs 8) Continue meloxicam 15mg daily prn; denies AEs 9) RTC post procedure

## 2025-06-19 NOTE — DATA REVIEWED
[CT Scan] : CT scan [MRI] : MRI [Cervical Spine] : cervical spine [Lumbar Spine] : lumbar spine [Report was reviewed and noted in the chart] : The report was reviewed and noted in the chart [I independently reviewed and interpreted images and report] : I independently reviewed and interpreted images and report [FreeTextEntry1] : 6/13/2025 MRI Cervical Spine O&C C2-C3: small disc bulge w/ mild central stenosis C3-C4: diffuse disc bulge, broad based disc protrusion w/ mild to moderate central stenosis; mild bilateral NF stenosis C4-C5: diffuse disc bulge, small central disc protrusion w/ mild central stenosis; mild bilateral NF stenosis C5-C6: diffuse disc bulge, mild to moderate central stenosis  6/13/2025 MRI Lumbar Spine O&C L4-L5: diffuse disc bulge, broad based central disc herniation, mild central stenosis, mild bilateral NF stenosis  10/2/2024 CT Lumbar Spine ([facility]) INTERPRETATION: Clinical indications: Back pain.  Multiple axial sections were performed through the lumbar spine. Coronal and sagittal reconstructions were performed  Loss of the normal cervical lordosis is seen  The vertebral body height and alignment appear normal.  No acute fractures or dislocations are identified  T11-12: Normal  T12-L1: Normal  L1-2: Normal  L2-3: Normal  L3-4: Mild disc bulge is seen without significant compromise of the spinal canal or either neural foramen  L4-5: Disc bulge and bilateral hypertrophic facet joint changes. Mild narrowing of both neural foramen  Sacralization of L5 is seen.  L5-S1: Normal  Evaluation of the paraspinal soft tissues is limited by lack of IV contrast though grossly normal  Dilated bladder is identified. Please correlate clinically  IMPRESSION: Mild degenerative changes as described above.

## 2025-06-19 NOTE — DATA REVIEWED
[CT Scan] : CT scan [MRI] : MRI [Cervical Spine] : cervical spine [Lumbar Spine] : lumbar spine [Report was reviewed and noted in the chart] : The report was reviewed and noted in the chart [I independently reviewed and interpreted images and report] : I independently reviewed and interpreted images and report [FreeTextEntry1] : 6/13/2025 MRI Cervical Spine O&C C2-C3: small disc bulge w/ mild central stenosis C3-C4: diffuse disc bulge, broad based disc protrusion w/ mild to moderate central stenosis; mild bilateral NF stenosis C4-C5: diffuse disc bulge, small central disc protrusion w/ mild central stenosis; mild bilateral NF stenosis C5-C6: diffuse disc bulge, mild to moderate central stenosis  6/13/2025 MRI Lumbar Spine O&C L4-L5: diffuse disc bulge, broad based central disc herniation, mild central stenosis, mild bilateral NF stenosis  10/2/2024 CT Lumbar Spine ([facility]) INTERPRETATION: Clinical indications: Back pain.  Multiple axial sections were performed through the lumbar spine. Coronal and sagittal reconstructions were performed  Loss of the normal cervical lordosis is seen  The vertebral body height and alignment appear normal.  No acute fractures or dislocations are identified  T11-12: Normal  T12-L1: Normal  L1-2: Normal  L2-3: Normal  L3-4: Mild disc bulge is seen without significant compromise of the spinal canal or either neural foramen  L4-5: Disc bulge and bilateral hypertrophic facet joint changes. Mild narrowing of both neural foramen  Sacralization of L5 is seen.  L5-S1: Normal  Evaluation of the paraspinal soft tissues is limited by lack of IV contrast though grossly normal  Dilated bladder is identified. Please correlate clinically  IMPRESSION: Mild degenerative changes as described above.

## 2025-06-19 NOTE — PHYSICAL EXAM
[de-identified] : Gen: NAD Head: NC/AT Eyes: no glasses, no scleral icterus ENT: mucous membranes moist CV: No JVD Lungs: nonlabored breathing Abd: soft, NT/ND Ext: full ROM in all extremities, no peripheral edema Back: +TTPI in the lumbar paraspinal region, +SLR bilaterally Neuro: CN intact LEs +5 L +5 R hip flexion +5 L +5 R leg extension +5 L +5 R leg flexion +5 L +5 R foot dorsiflexion +5 L +5 R foot plantarflexion +5 L +5 R EHL extension Psych: normal affect Skin: no visible lesions

## 2025-06-19 NOTE — HISTORY OF PRESENT ILLNESS
[Lower back] : lower back [Buttock] : buttock [Left Leg] : left leg [Right Leg] : right leg [10] : 10 [Burning] : burning [Dull/Aching] : dull/aching [Radiating] : radiating [Shooting] : shooting [Stabbing] : stabbing [Throbbing] : throbbing [Tingling] : tingling [Constant] : constant [Household chores] : household chores [Leisure] : leisure [Sleep] : sleep [Nothing helps with pain getting better] : Nothing helps with pain getting better [Sitting] : sitting [Standing] : standing [Walking] : walking [Stairs] : stairs [FreeTextEntry1] : 6/19/2025: PATRICIA HANNA is a 50 year-old man presenting for a RPV for a history of chronic neck and low back pain. Patient continues to have pain in the neck w/ radiation to both upper extremities in the arms, forearms, and hands. Notes tingling and numbness throughout. Also continues w/ low back pain w/ radiation to the bilateral posterior thighs and legs.  The patient states that average pain over the past week was 10/10 in severity. Mood: Patient has occasional depression, notes anxiety that is associated w/ insomnia. Is seeing a psychiatrist.  Sleep:  Patient notes difficulty with sleep initiation and maintenance 2/2 pain.   6/5/2025 PATRICIA HANNA is a 50 year-old man presenting for a RPV for a history of chronic neck and low back pain. Patient continues to have pain across the low back w/ radiation to the bilateral posterior thighs and legs. Has been taking pregabalin 200mg TID. Denies AEs. Patient also notes ongoing pain in the neck w/ radiation to the UEs with tingling and numbness in the hands.  The patient states that average pain over the past week was 10/10 in severity.  Mood: Patient has occasional depression, no anxiety.  Sleep:  Patient notes difficulty with sleep initiation and maintenance 2/2 pain.   5/1/2025 PATRICIA HANNA is a 50 year-old man presenting for a RPV for a history of chronic neck and low back pain. Patient continues to have pain in the back w/ radiation to the lower extremities, including posterior thighs and legs into the feet. Notes tingling and numbness, no weakness. Also notes pain in the neck and upper extremities. The patient notes that pregabalin is not helping with pain.  The patient states that average pain over the past week was 10/10 in severity. Mood: Patient has occasional depression, no anxiety.  Sleep:  Patient notes difficulty with sleep initiation and maintenance 2/2 pain.   3/20/2025 PATRICIA HANNA is a 50 year-old man presenting for a RPV for a history of chronic low back pain. On 3/6/2025, the patient underwent a BILATERAL L4-L5 TFESI w/o MAC. He notes modest improvement of pain since the procedure. Patient continues to have intermittent heaviness, numbness in the lowe06/05r extremities including the legs and thighs. Patient also continues to have pain in the hands, elbows.  The patient states that average pain over the past week was 10/10 in severity. Mood: Patient has occasional depression, no anxiety.  Sleep: Patient notes difficulty with sleep initiation and maintenance 2/2 pain.   1/27/2025 PATRICIA HANNA is a 50 year-old man presenting for a RPV for a history of chronic low back pain. Patient underwent a TPI at last visit, which did not help significantly with his pain. The patient notes having ongoing pain in the low back w/ radiation to the bilateral posterior thighs and legs. Notes tingling and numbness. Notes having difficulty with his gait.  The patient states that average pain over the past week was 10/10 in severity. Mood: Patient has occasional depression, no anxiety.  Sleep: Patient notes difficulty with sleep initiation and maintenance 2/2 pain.   11/25/2024: PATRICIA HANNA is a 50 year-old man presenting for a NPV for a history of chronic low back pain. Patient notes a 2-3 year history of pain in the low back pain. He has never had this pain treated previously. Patient describes an aching pain across the low back w/ radiation to the bilateral lower extremities. Notes intermittent tingling and numbness. No weakness.  The patient states that average pain over the past week was 10/10 in severity. Mood: Patient has occasional depression, no anxiety.  Sleep: Patient notes difficulty with sleep initiation and maintenance 2/2 pain.   [] : no [FreeTextEntry6] : numbness and tingling in bilateral hands, legs and feet  [FreeTextEntry7] : Bialateral hands, feet and legs  [de-identified] : Gabapentin 400mg did not help [TWNoteComboBox1] : 0%

## 2025-06-19 NOTE — PHYSICAL EXAM
[de-identified] : Gen: NAD Head: NC/AT Eyes: no glasses, no scleral icterus ENT: mucous membranes moist CV: No JVD Lungs: nonlabored breathing Abd: soft, NT/ND Ext: full ROM in all extremities, no peripheral edema Back: +TTPI in the lumbar paraspinal region, +SLR bilaterally Neuro: CN intact LEs +5 L +5 R hip flexion +5 L +5 R leg extension +5 L +5 R leg flexion +5 L +5 R foot dorsiflexion +5 L +5 R foot plantarflexion +5 L +5 R EHL extension Psych: normal affect Skin: no visible lesions

## 2025-07-03 NOTE — HISTORY OF PRESENT ILLNESS
No, no labs needed.   [FreeTextEntry1] : follow up [de-identified] : 51y/o male with PMH of T1DM/MAL, peripheral neuropathy, autonomic neuropathy, orthostatic hypotension, ?hypogonadism?, chronic back pain s/p bilateral L4-L5 TFESI w/o MAC, venous insufficiency p/w follow up. Main concerns: pending an operation on his R arm for "pinched nerves", back pain, hypotension follow up, GERD. Most important concern is his hypotension. States he has at least 1 episode per day and has fallen in the past. Most pronounced when he stands up. States he took midodrine a few hours before he got here and has run out of droxidopa. Denies polyuria, polydipsia.

## 2025-07-03 NOTE — PHYSICAL EXAM
[No Acute Distress] : no acute distress [No Focal Deficits] : no focal deficits [Normal Gait] : normal gait [Normal] : affect was normal and insight and judgment were intact

## 2025-07-03 NOTE — HISTORY OF PRESENT ILLNESS
[FreeTextEntry1] : follow up [de-identified] : 49y/o male with PMH of T1DM/MAL, peripheral neuropathy, autonomic neuropathy, orthostatic hypotension, ?hypogonadism?, chronic back pain s/p bilateral L4-L5 TFESI w/o MAC, venous insufficiency p/w follow up. Main concerns: pending an operation on his R arm for "pinched nerves", back pain, hypotension follow up, GERD. Most important concern is his hypotension. States he has at least 1 episode per day and has fallen in the past. Most pronounced when he stands up. States he took midodrine a few hours before he got here and has run out of droxidopa. Denies polyuria, polydipsia.

## 2025-07-03 NOTE — ASSESSMENT
[FreeTextEntry1] : 49y/o male with PMH of T1DM/MAL, peripheral neuropathy, autonomic neuropathy, orthostatic hypotension, ?hypogonadism?, chronic back pain s/p bilateral L4-L5 TFESI w/o MAC, venous insufficiency p/w follow up on hypotension.   D/w Dr. Rich  RTC in 5 weeks for follow up on orthostatic hypotension  Bernard Hernandez PGY-3 [employer]

## 2025-07-10 NOTE — PLAN
[FreeTextEntry1] : Following 8AM labs ordered for home draw for pt convenience:  ACTH Cortisol TSH Free T4 Iron TIBC Ferritin Transferrin  RTC to follow up on orthostatic hypotension and T1DM with PCP as scheduled on August 7th   Discussed with Dr. Layla Lynn ([facility]) MD [place] EM/IM PGY-3

## 2025-07-10 NOTE — PHYSICAL EXAM
[No Acute Distress] : no acute distress [PERRL] : pupils equal round and reactive to light [EOMI] : extraocular movements intact [Normal Oropharynx] : the oropharynx was normal [No JVD] : no jugular venous distention [Supple] : supple [No Respiratory Distress] : no respiratory distress  [Clear to Auscultation] : lungs were clear to auscultation bilaterally [Regular Rhythm] : with a regular rhythm [Normal S1, S2] : normal S1 and S2 [No Murmur] : no murmur heard [No Varicosities] : no varicosities [Soft] : abdomen soft [Non Tender] : non-tender [Non-distended] : non-distended [No Spinal Tenderness] : no spinal tenderness [No Joint Swelling] : no joint swelling [Grossly Normal Strength/Tone] : grossly normal strength/tone [Coordination Grossly Intact] : coordination grossly intact [Normal Gait] : normal gait [Normal Affect] : the affect was normal [Normal Insight/Judgement] : insight and judgment were intact [de-identified] : thin [de-identified] : Regular, mildly tachycardic 100s  [de-identified] : 2_ pitting edema to b/l mid-shin  [de-identified] : hemorrhagic 1cm circular blister x1 in each hand, callous in hand [place]

## 2025-07-10 NOTE — REVIEW OF SYSTEMS
[Back Pain] : back pain [Dizziness] : dizziness [Unsteady Walk] : ataxia [Fever] : no fever [Chills] : no chills [Vision Problems] : no vision problems [Chest Pain] : no chest pain [Palpitations] : no palpitations [Claudication] : no  leg claudication [Lower Ext Edema] : no lower extremity edema [Orthopena] : no orthopnea [Shortness Of Breath] : no shortness of breath [Abdominal Pain] : no abdominal pain [Dysuria] : no dysuria

## 2025-07-10 NOTE — CONSULT LETTER
[FreeTextEntry1] : Ed Reardon MD  [FreeTextEntry2] : T1DM  Hypotension  [FreeTextEntry3] : Pt is a 49 yo old male with h/o  MAL -T1DM dx in Oct 2024 . curently on insulin  pump therapy with [employer] ( i believe ) closed loop system with Dexcom G7 CGM.   Pt has been experiencing issues with the pump  In addition, pt has been  dx with diabetic cardiac autonomic insufficiency      PMH  MAL/ T1DM dx 2024   but pt with DM since 2018  chronic orthostatic hypotension  high cholesterol  ED in past pt  used testsoterone   GERD Myalgia cervical radiculopathy    PSH  None   FH   none   Soc HX  nonsmoker  No alcohol  use    All can't retrieve  Meds CYclobenaprine Duloxetine Fiasp Florinef Midodrine Zofran Pantoprazole PreGabalin   Labs  July 2025  HGB A1C	9.4 %	H	4.0-5.6 May  2025 Percent Free Cortisol	4.7 %	 	 	  	 Reference Range: Adults 8:00 am: 2.3 - 9.5 Performed At: [facility] [address], [place] [id number] Mani RUBALCAVA MD Ph:[phone]  	Cortisol, Serum LCMS	6.0 ug/dL	 	 	  	 This test was developed and its performance characteristics determined by [facility]. It has not been cleared or approved by the Food and Drug Administration. Reference Range: Adults 8:00 AM  8.0 - 19 4:00 PM  4.0 - 11  	Cortisol, Free	0.28 ug/dL	 	 	  	 Reference Range: Adults 8:00 AM: 0.2 - 1.8  	Corticosteroid Bind Globulin	1.9 mg/dL [FreeTextEntry4] : T1DM  - Would have pt reach out to insulin pump  company - [employer] - I believe-  to help troubleshoot pump issues at this time -in addition , he will need to be seen by his outpt [facility] ASAP - will request this for him as well   Orthostasis For possible adrenal insufficiency- pt had cortisol level  in May -  done in afternoon which were normal  but ACTH was cancelled by lab    Pt is on Florinef once daily   can check following labs before 8 Am and right before he takes his Florinef in AM  ( Florinef can affect ACTH and cortisol )  ACTH  cortisol    TSH   free T4  Iron  TIBC  Ferritin  Transferrin  [FreeTextEntry5] : If there are any further questions , please reach out to me  Awake/Alert/Cooperative

## 2025-07-10 NOTE — END OF VISIT
[] : Resident [FreeTextEntry3] : Pt still not optimized for surgery. Only taking midodrine, not take mineralocorticoid, so BP with wide fluctuations which are symptomatic to pt. On exam, pt appears sleepy at times during encounter, NAD, thin. Right hand with fixed contracture, limited ROM. Discussed at length with pt, he prefers to proceed with surgery despite risks of hemodynamic instability and poor wound healing due to DM OOC. Will continue to work towards managing these, although sometimes pt reluctant to take adequate insulin doses. Will try to expedite endocrine appt, and do labs to help dx the underlying cause of bp swings. Pt instructed that meds were sent to pharmacy and he should get these and take them.

## 2025-07-10 NOTE — HISTORY OF PRESENT ILLNESS
[Diabetes] : diabetes [Poor (<4 METs)] : Poor (<4 METs) [No Pertinent Cardiac History] : no history of aortic stenosis, atrial fibrillation, coronary artery disease, recent myocardial infarction, or implantable device/pacemaker [No Pertinent Pulmonary History] : no history of asthma, COPD, sleep apnea, or smoking [Aortic Stenosis] : no aortic stenosis [Atrial Fibrillation] : no atrial fibrillation [Coronary Artery Disease] : no coronary artery disease [Recent Myocardial Infarction] : no recent myocardial infarction [Implantable Device/Pacemaker] : no implantable device/pacemaker [Self] : no previous adverse anesthesia reaction [Chronic Anticoagulation] : no chronic anticoagulation [Chronic Kidney Disease] : no chronic kidney disease [FreeTextEntry1] : Right carpel tunnel procedure [FreeTextEntry2] : 07/17/2025 [FreeTextEntry3] : Dr. Ryan Sapp  [FreeTextEntry4] : 50M PMHx T1DM/MAL on insulin pump (A1c 9.4 7/2025), peripheral neuropathy, autonomic neuropathy, orthostatic hypotension, ?hypogonadism?, GERD, chronic back pain s/p b/l L4-5 TFESI w/o MAC presents for pre-surgical testing for right sided carpel tunnel surgery with Ryan Mcneal on 7/17.  Reports he has been using insulin pump since April, malfunction recently was due to G7 sensor not syncing leading to insulin not been delivered. This issue has since resolved. Expressed desire to switch to omnipod as the current pump has tubing which sometimes can get caught. Reports sometimes forgetting to change insulin cartridge leading to hyperglycemia. States BGL currently 300s because he rushed to come to clinic today so forgot to change empty insulin cartridge. Insulin pump supplies are ordered by [employer] to be co-signed by endocrinology. Given A1c 9.4 and T1Dm, stressed importance of having insulin at all times. Pt agreeable to taking prior basal bolus regimen prior regimen of Lantus 24u and Humalog 15u TIDAC if insulin pump malfunctions, but did express not feeling comfortable giving such high dose of insulin, states he would only give 6u at night before.  Pt expressed frustration regarding long process of diagnosing his symptomatic hypotension, was not able to  fludrocortisone prescribed recently. Still symptomatic with lightheadedness with minimal walking - only able to walk across stresst. Unable to walk stairs. Has been taking midodrine 10mg TID.   Currently resident at [facility] (short term housing) in [place].  [FreeTextEntry6] : Patient denies chest pain, HENRY, claudication, orthopnea. However difficulty with walking for more than 1 block because of lightheadedness ascribed to hypotension. Unable to climb a flight of stairs  Last syncopized last month from hypotension SBP 70s while walking.  [FreeTextEntry7] : TTW 2/2025: LVEF 58% w/o RWMA, normal LV thickness. Normal RV function. No significant valvular disease.

## 2025-07-10 NOTE — ASSESSMENT
[No Further Testing Recommended] : no further testing recommended [Patient NOT optimized for Surgery at this time] : Patient not optimized for surgery at this time [FreeTextEntry4] : Given poor MET and A1c >8, patient is not optimized for elective carpel tunnel surgery at this time. Needs better control of DM and BP - diagnosis still not complete, and patient with wide fluctuations of BP, symptomatic. Will continue to work on management of these 2 issues so pt can more safely undergo procedure.

## 2025-07-25 NOTE — ADDENDUM
[FreeTextEntry1] :  07/16/2025 Called Orthopedist Dr Sapp's office and clarified pt is scheduled for R cubital tunnel and R carpal tunnel release.  As per support staff, procedure is scheduled for 1 hour and is under block anesthesia.   However, without any access to pump data and without cortisol/ACTH, recommend rescheduling for later date.  Message left for Dr. Sapp.  -reached out to [employer] team. Account to be created to link data.   7/17/25: Contacted [employer].   Clarified that pt was trained by the [place] [employer] teams, initiated pump on ~5/25.   Received report that shows (last 30 days) TIR 56.%. high 41.1%, low 1.7%.  GMI 7.7%, average 184, CV 46.2. Pt with only 4 boluses.    -usual breakfast dose 5.1 U, lunch 9.8 U, dinner 5.2 U.  -total daily insulin 48 U.  total daily basal calculated as 24 U.  --> recent glucoses ~stable.  Surgery staff had noted procedure is ~ 1 hour wi block anesthesia.  Can proceed to OR and keep pump with CGM if not done under CT/MRI, fluoroscopy, X-rays, etc.  7/18/2025: Called pt and informed pt of plan that can proceed to OR.   Emphasized bolusing for all meals. Pt later admits that he may have only bolused a few times in the past month. Emphasized exchanging infusion set every 3 days. Pt admits that he may not do this because he doesn't want to. Pt states he will get early AM labs drawn next week for cortisol, ACTH.  Pt states had 2 vascular procedures done earlier this year (under local anethesia) without any issues.  No specific clinical symptoms s/o AI. States that he will be getting a steroid injection next week.  --> again emphasized importance of bolusing for all meals and to also input high glucose values for corrections. Pending next week's lab results, will update Surgical team. Procedure is not yet scheduled.  --> Rx sent for basal back-up insulin pen 24 Units every 24 hours if pump malfunctions. Pt aware to resume pump 24 hr after last basal dose.   Pt confirms he has back-up glucose meter and supplies, has pen needles, and bolus back-up insulin pen. -refill for Dexcom G7 sensors prescribed. Has active Fiasp cartridge refills. Pt confirms that he has all other pump supplies w refills.   Called pt to discuss results/plan.   All questions answered.   07/25/2025 7/24/25 ~9 AM cortisiol 12.2, ACTH 35.9.  No adrenal insufficiency. -called Dr. Sapp's office and updated that pt can proceed w procedure.  As per surgery staff, duration is ~ 1 hr and under block anesthesia. If not done under CT/MRI, fluoroscopy, nuclear scans, X-rays, etc, can continue insulin pump for procedure.    -If pump needs to be removed, SQ doses would be: 24 Units every 24 hours of basal Lantus insulin (pt confirms it is ready for delivery from pharmacy) and 5-9 Units AC of rapid-acting Fiasp for meals.   Spoke with Edward from Dr Sapp's office. Faxed last visit note, lab results to [phone]. Also updated patient.  All questions answered.

## 2025-07-25 NOTE — HISTORY OF PRESENT ILLNESS
[FreeTextEntry1] : PCP: MARILU BOLIVAR  [phone] [facility] Surgery: Dr. Sapp ([phone]).    50 year M with h/o T1DM (c/b peripheral and autonomic neuropathy), chronic orthostatic hypotension, ?HTN, radiculopathy/chronic back pain, GERD, ?hypogonadism for DM.  INITIAL VISIT 07/15/2025: -had e-consult Endocrine (Dr. KARSTEN Vivar, 7/10/25): As per note: " dx with diabetic cardiac autonomic insufficiency.  Plan included reaching out to iLet to troubleshot pump issues.  Can check various labs.  Afternoon PM cortisol.   -had last in-office Endocrine visit note 4/15/2025 w Dr. AHRITHA Rodriguez. As per note:        -diagnosed with MAL in 2024, prior to that was treated as T2DM at times since 2018. Started insulin therapy about a year ago.    -Current regimen: Lantus 24 units qhs, Humalog 15 units TIDAC. reports adherence but does express his opinion that he does not think lantus is very important.    -Plan: Referred for beta bionic.  In the meantime, increase Lantus to 30 U HS and increase Humalog to 18 U.     -Unclear if he had true hx of hypogonadism.  Testosterond 488 in normal range.    -recommended f/u w Endocrinology in 3 months.    -pt is here today, stating that the prior provider (?endocrinologist) recommended that pt f/u with Endocrinology at [address] although has a f/u appt scheduled in 10/2025 with same endocrinology clinic.  #) T1DM: -POC HbA1c today 8.8%; POCG 134 today (~3 hr postprandial: states bolused for lunch) -using Ilet pump (SN [id number]).  Does not know which insulin he uses in pump (as per meds list, Fiasp). -states boluses for all meals. Denies snacking.  Does not carb-count. -states that he exchanges pump cartridge and infusion set whenever the insulin runs low (and does not exchange every 3 days) -states that he has bolus insulin pens and pen needles at home.  Does not have basal insulin. States he was last taking basal insulin 16 Units.  -states is planned for carpal tunnel surgery this Thursday (7/16/25) at [facility].  -states that prior surgery date was cancelled due to hyperglycemia, n/v (pt states that CGM was malfunctioning; was incorrectly reading low glucoses when was actually hyperglycemic;  ED visit ~2 weeks ago at [facility]).   DM HISTORY: -diagnosed w T1DM/MAL in ~2018 during [employer] () employment renewal -prior hospitalizations: initially stated multiple times and later states, only 2 prior hospitalizations/ED visit for DM.  2/2025 and ~2 weeks ago.     -Was last in the hospital (~2 weeks ago, [facility] ED) when was planned for carpal tunnel surgery (deferred due to hyperglycemia and nausea/vomiting); states was in setting of malfunctioning CGM.   -prior 2/2025 hospitalization at [facility]. As per inpt endocrine consult note: presents to the hospital for persistent nausea/emesis, and failure to thrive.  HbA1c 10.1%.  Mild DKA:  AG 17, bicarb 21 with , pH 7.32    - Home regimen: Lantus 24 units nightly + Humalog 12 units with meals    - Egfr: 112    - Sugars are running severely high at home- above range 90% of the time  -SOCIAL HX: -currently unemployed -resides in shelter   COMPLICATIONS: neuropathy ?DKA   GLUCOSE monitoring: Dexcom G7 -pt does not have Dexcom clarity nicole. Unable to view CGM data. -unable to view pump data (pt is not linked to an account)   #) H/o orthostatic hypotension: -Meds list includes midodrine.  Fludrocortisone 0.1 mg daily started 7/2/2025. As per PCP note (7/2/2025), likely 2/2 cardiac autonomic neuropathy.  CURRENT MEDICATIONS (relevant): -iLet pump (?Fiasp) -fludrocortisone 0.1 mg dailiy -midodrine 10 mg TID -pantoprazole -duloxetine

## 2025-07-25 NOTE — HISTORY OF PRESENT ILLNESS
[FreeTextEntry1] : PCP: MARILU BOLIVAR  [phone] [facility] Surgery: Dr. Sapp ([phone]).    50 year M with h/o T1DM (c/b peripheral and autonomic neuropathy), chronic orthostatic hypotension, ?HTN, radiculopathy/chronic back pain, GERD, ?hypogonadism for DM.  INITIAL VISIT 07/15/2025: -had e-consult Endocrine (Dr. KARSTEN Vivar, 7/10/25): As per note: " dx with diabetic cardiac autonomic insufficiency.  Plan included reaching out to iLet to troubleshot pump issues.  Can check various labs.  Afternoon PM cortisol.   -had last in-office Endocrine visit note 4/15/2025 w Dr. HARITHA Rodriguez. As per note:        -diagnosed with MAL in 2024, prior to that was treated as T2DM at times since 2018. Started insulin therapy about a year ago.    -Current regimen: Lantus 24 units qhs, Humalog 15 units TIDAC. reports adherence but does express his opinion that he does not think lantus is very important.    -Plan: Referred for beta bionic.  In the meantime, increase Lantus to 30 U HS and increase Humalog to 18 U.     -Unclear if he had true hx of hypogonadism.  Testosterond 488 in normal range.    -recommended f/u w Endocrinology in 3 months.    -pt is here today, stating that the prior provider (?endocrinologist) recommended that pt f/u with Endocrinology at [address] although has a f/u appt scheduled in 10/2025 with same endocrinology clinic.  #) T1DM: -POC HbA1c today 8.8%; POCG 134 today (~3 hr postprandial: states bolused for lunch) -using Ilet pump (SN [id number]).  Does not know which insulin he uses in pump (as per meds list, Fiasp). -states boluses for all meals. Denies snacking.  Does not carb-count. -states that he exchanges pump cartridge and infusion set whenever the insulin runs low (and does not exchange every 3 days) -states that he has bolus insulin pens and pen needles at home.  Does not have basal insulin. States he was last taking basal insulin 16 Units.  -states is planned for carpal tunnel surgery this Thursday (7/16/25) at [facility].  -states that prior surgery date was cancelled due to hyperglycemia, n/v (pt states that CGM was malfunctioning; was incorrectly reading low glucoses when was actually hyperglycemic;  ED visit ~2 weeks ago at [facility]).   DM HISTORY: -diagnosed w T1DM/MAL in ~2018 during [employer] () employment renewal -prior hospitalizations: initially stated multiple times and later states, only 2 prior hospitalizations/ED visit for DM.  2/2025 and ~2 weeks ago.     -Was last in the hospital (~2 weeks ago, [facility] ED) when was planned for carpal tunnel surgery (deferred due to hyperglycemia and nausea/vomiting); states was in setting of malfunctioning CGM.   -prior 2/2025 hospitalization at [facility]. As per inpt endocrine consult note: presents to the hospital for persistent nausea/emesis, and failure to thrive.  HbA1c 10.1%.  Mild DKA:  AG 17, bicarb 21 with , pH 7.32    - Home regimen: Lantus 24 units nightly + Humalog 12 units with meals    - Egfr: 112    - Sugars are running severely high at home- above range 90% of the time  -SOCIAL HX: -currently unemployed -resides in shelter   COMPLICATIONS: neuropathy ?DKA   GLUCOSE monitoring: Dexcom G7 -pt does not have Dexcom clarity nicole. Unable to view CGM data. -unable to view pump data (pt is not linked to an account)   #) H/o orthostatic hypotension: -Meds list includes midodrine.  Fludrocortisone 0.1 mg daily started 7/2/2025. As per PCP note (7/2/2025), likely 2/2 cardiac autonomic neuropathy.  CURRENT MEDICATIONS (relevant): -iLet pump (?Fiasp) -fludrocortisone 0.1 mg dailiy -midodrine 10 mg TID -pantoprazole -duloxetine

## 2025-07-25 NOTE — PHYSICAL EXAM
[Alert] : alert [No Acute Distress] : no acute distress [Normal Sclera/Conjunctiva] : normal sclera/conjunctiva [No Respiratory Distress] : no respiratory distress [Normal Rate] : heart rate was normal [Regular Rhythm] : with a regular rhythm [Normal Gait] : normal gait [Oriented x3] : oriented to person, place, and time [de-identified] : BMI 23 [de-identified] : No visible goiter [de-identified] : no hyperpigmentation.

## 2025-07-25 NOTE — PHYSICAL EXAM
[Alert] : alert [No Acute Distress] : no acute distress [Normal Sclera/Conjunctiva] : normal sclera/conjunctiva [No Respiratory Distress] : no respiratory distress [Normal Rate] : heart rate was normal [Regular Rhythm] : with a regular rhythm [Normal Gait] : normal gait [Oriented x3] : oriented to person, place, and time [de-identified] : BMI 23 [de-identified] : No visible goiter [de-identified] : no hyperpigmentation.

## 2025-07-25 NOTE — HISTORY OF PRESENT ILLNESS
[FreeTextEntry1] : PCP: MARILU BOLIVAR  [phone] [facility] Surgery: Dr. Sapp ([phone]).    50 year M with h/o T1DM (c/b peripheral and autonomic neuropathy), chronic orthostatic hypotension, ?HTN, radiculopathy/chronic back pain, GERD, ?hypogonadism for DM.  INITIAL VISIT 07/15/2025: -had e-consult Endocrine (Dr. KARSTEN Vivar, 7/10/25): As per note: " dx with diabetic cardiac autonomic insufficiency.  Plan included reaching out to iLet to troubleshot pump issues.  Can check various labs.  Afternoon PM cortisol.   -had last in-office Endocrine visit note 4/15/2025 w Dr. HARITHA Rodriguez. As per note:        -diagnosed with MAL in 2024, prior to that was treated as T2DM at times since 2018. Started insulin therapy about a year ago.    -Current regimen: Lantus 24 units qhs, Humalog 15 units TIDAC. reports adherence but does express his opinion that he does not think lantus is very important.    -Plan: Referred for beta bionic.  In the meantime, increase Lantus to 30 U HS and increase Humalog to 18 U.     -Unclear if he had true hx of hypogonadism.  Testosterond 488 in normal range.    -recommended f/u w Endocrinology in 3 months.    -pt is here today, stating that the prior provider (?endocrinologist) recommended that pt f/u with Endocrinology at [address] although has a f/u appt scheduled in 10/2025 with same endocrinology clinic.  #) T1DM: -POC HbA1c today 8.8%; POCG 134 today (~3 hr postprandial: states bolused for lunch) -using Ilet pump (SN [id number]).  Does not know which insulin he uses in pump (as per meds list, Fiasp). -states boluses for all meals. Denies snacking.  Does not carb-count. -states that he exchanges pump cartridge and infusion set whenever the insulin runs low (and does not exchange every 3 days) -states that he has bolus insulin pens and pen needles at home.  Does not have basal insulin. States he was last taking basal insulin 16 Units.  -states is planned for carpal tunnel surgery this Thursday (7/16/25) at [facility].  -states that prior surgery date was cancelled due to hyperglycemia, n/v (pt states that CGM was malfunctioning; was incorrectly reading low glucoses when was actually hyperglycemic;  ED visit ~2 weeks ago at [facility]).   DM HISTORY: -diagnosed w T1DM/MAL in ~2018 during [employer] () employment renewal -prior hospitalizations: initially stated multiple times and later states, only 2 prior hospitalizations/ED visit for DM.  2/2025 and ~2 weeks ago.     -Was last in the hospital (~2 weeks ago, [facility] ED) when was planned for carpal tunnel surgery (deferred due to hyperglycemia and nausea/vomiting); states was in setting of malfunctioning CGM.   -prior 2/2025 hospitalization at [facility]. As per inpt endocrine consult note: presents to the hospital for persistent nausea/emesis, and failure to thrive.  HbA1c 10.1%.  Mild DKA:  AG 17, bicarb 21 with , pH 7.32    - Home regimen: Lantus 24 units nightly + Humalog 12 units with meals    - Egfr: 112    - Sugars are running severely high at home- above range 90% of the time  -SOCIAL HX: -currently unemployed -resides in shelter   COMPLICATIONS: neuropathy ?DKA   GLUCOSE monitoring: Dexcom G7 -pt does not have Dexcom clarity nicole. Unable to view CGM data. -unable to view pump data (pt is not linked to an account)   #) H/o orthostatic hypotension: -Meds list includes midodrine.  Fludrocortisone 0.1 mg daily started 7/2/2025. As per PCP note (7/2/2025), likely 2/2 cardiac autonomic neuropathy.  CURRENT MEDICATIONS (relevant): -iLet pump (?Fiasp) -fludrocortisone 0.1 mg dailiy -midodrine 10 mg TID -pantoprazole -duloxetine

## 2025-07-25 NOTE — PHYSICAL EXAM
[Alert] : alert [No Acute Distress] : no acute distress [Normal Sclera/Conjunctiva] : normal sclera/conjunctiva [No Respiratory Distress] : no respiratory distress [Normal Rate] : heart rate was normal [Regular Rhythm] : with a regular rhythm [Normal Gait] : normal gait [Oriented x3] : oriented to person, place, and time [de-identified] : BMI 23 [de-identified] : No visible goiter [de-identified] : no hyperpigmentation.

## 2025-07-25 NOTE — HISTORY OF PRESENT ILLNESS
[FreeTextEntry1] : PCP: MARILU BOLIVAR  [phone] [facility] Surgery: Dr. Sapp ([phone]).    50 year M with h/o T1DM (c/b peripheral and autonomic neuropathy), chronic orthostatic hypotension, ?HTN, radiculopathy/chronic back pain, GERD, ?hypogonadism for DM.  INITIAL VISIT 07/15/2025: -had e-consult Endocrine (Dr. KARSTEN Vivar, 7/10/25): As per note: " dx with diabetic cardiac autonomic insufficiency.  Plan included reaching out to iLet to troubleshot pump issues.  Can check various labs.  Afternoon PM cortisol.   -had last in-office Endocrine visit note 4/15/2025 w Dr. HARITHA Rodriguez. As per note:        -diagnosed with MAL in 2024, prior to that was treated as T2DM at times since 2018. Started insulin therapy about a year ago.    -Current regimen: Lantus 24 units qhs, Humalog 15 units TIDAC. reports adherence but does express his opinion that he does not think lantus is very important.    -Plan: Referred for beta bionic.  In the meantime, increase Lantus to 30 U HS and increase Humalog to 18 U.     -Unclear if he had true hx of hypogonadism.  Testosterond 488 in normal range.    -recommended f/u w Endocrinology in 3 months.    -pt is here today, stating that the prior provider (?endocrinologist) recommended that pt f/u with Endocrinology at [address] although has a f/u appt scheduled in 10/2025 with same endocrinology clinic.  #) T1DM: -POC HbA1c today 8.8%; POCG 134 today (~3 hr postprandial: states bolused for lunch) -using Ilet pump (SN [id number]).  Does not know which insulin he uses in pump (as per meds list, Fiasp). -states boluses for all meals. Denies snacking.  Does not carb-count. -states that he exchanges pump cartridge and infusion set whenever the insulin runs low (and does not exchange every 3 days) -states that he has bolus insulin pens and pen needles at home.  Does not have basal insulin. States he was last taking basal insulin 16 Units.  -states is planned for carpal tunnel surgery this Thursday (7/16/25) at [facility].  -states that prior surgery date was cancelled due to hyperglycemia, n/v (pt states that CGM was malfunctioning; was incorrectly reading low glucoses when was actually hyperglycemic;  ED visit ~2 weeks ago at [facility]).   DM HISTORY: -diagnosed w T1DM/MAL in ~2018 during [employer] () employment renewal -prior hospitalizations: initially stated multiple times and later states, only 2 prior hospitalizations/ED visit for DM.  2/2025 and ~2 weeks ago.     -Was last in the hospital (~2 weeks ago, [facility] ED) when was planned for carpal tunnel surgery (deferred due to hyperglycemia and nausea/vomiting); states was in setting of malfunctioning CGM.   -prior 2/2025 hospitalization at [facility]. As per inpt endocrine consult note: presents to the hospital for persistent nausea/emesis, and failure to thrive.  HbA1c 10.1%.  Mild DKA:  AG 17, bicarb 21 with , pH 7.32    - Home regimen: Lantus 24 units nightly + Humalog 12 units with meals    - Egfr: 112    - Sugars are running severely high at home- above range 90% of the time  -SOCIAL HX: -currently unemployed -resides in shelter   COMPLICATIONS: neuropathy ?DKA   GLUCOSE monitoring: Dexcom G7 -pt does not have Dexcom clarity nicole. Unable to view CGM data. -unable to view pump data (pt is not linked to an account)   #) H/o orthostatic hypotension: -Meds list includes midodrine.  Fludrocortisone 0.1 mg daily started 7/2/2025. As per PCP note (7/2/2025), likely 2/2 cardiac autonomic neuropathy.  CURRENT MEDICATIONS (relevant): -iLet pump (?Fiasp) -fludrocortisone 0.1 mg dailiy -midodrine 10 mg TID -pantoprazole -duloxetine

## 2025-07-25 NOTE — ADDENDUM
[FreeTextEntry1] :  07/16/2025 Called Orthopedist Dr Sapp's office and clarified pt is scheduled for R cubital tunnel and R carpal tunnel release.  As per support staff, procedure is scheduled for 1 hour and is under block anesthesia.   However, without any access to pump data and without cortisol/ACTH, recommend rescheduling for later date.  Message left for Dr. Sapp.  -reached out to [employer] team. Account to be created to link data.   7/17/25: Contacted [employer].   Clarified that pt was trained by the [place] [employer] teams, initiated pump on ~5/25.   Received report that shows (last 30 days) TIR 56.%. high 41.1%, low 1.7%.  GMI 7.7%, average 184, CV 46.2. Pt with only 4 boluses.    -usual breakfast dose 5.1 U, lunch 9.8 U, dinner 5.2 U.  -total daily insulin 48 U.  total daily basal calculated as 24 U.  --> recent glucoses ~stable.  Surgery staff had noted procedure is ~ 1 hour wi block anesthesia.  Can proceed to OR and keep pump with CGM if not done under CT/MRI, fluoroscopy, X-rays, etc.  7/18/2025: Called pt and informed pt of plan that can proceed to OR.   Emphasized bolusing for all meals. Pt later admits that he may have only bolused a few times in the past month. Emphasized exchanging infusion set every 3 days. Pt admits that he may not do this because he doesn't want to. Pt states he will get early AM labs drawn next week for cortisol, ACTH.  Pt states had 2 vascular procedures done earlier this year (under local anethesia) without any issues.  No specific clinical symptoms s/o AI. States that he will be getting a steroid injection next week.  --> again emphasized importance of bolusing for all meals and to also input high glucose values for corrections. Pending next week's lab results, will update Surgical team. Procedure is not yet scheduled.  --> Rx sent for basal back-up insulin pen 24 Units every 24 hours if pump malfunctions. Pt aware to resume pump 24 hr after last basal dose.   Pt confirms he has back-up glucose meter and supplies, has pen needles, and bolus back-up insulin pen. -refill for Dexcom G7 sensors prescribed. Has active Fiasp cartridge refills. Pt confirms that he has all other pump supplies w refills.   Called pt to discuss results/plan.   All questions answered.   07/25/2025 7/24/25 ~9 AM cortisiol 12.2, ACTH 35.9.  No adrenal insufficiency. -called Dr. Sapp's office and updated that pt can proceed w procedure.  As per surgery staff, duration is ~ 1 hr and under block anesthesia. If not done under CT/MRI, fluoroscopy, nuclear scans, X-rays, etc, can continue insulin pump for procedure.    -If pump needs to be removed, SQ doses would be: 24 Units every 24 hours of basal Lantus insulin (pt confirms it is ready for delivery from pharmacy) and 5-9 Units AC of rapid-acting Fiasp for meals.   Spoke with Edward from Dr Sapp's office. Faxed last visit note, lab results to [phone]. Also updated patient.  All questions answered.

## 2025-07-25 NOTE — ADDENDUM
[FreeTextEntry1] :  07/16/2025 Called Orthopedist Dr Sapp's office and clarified pt is scheduled for R cubital tunnel and R carpal tunnel release.  As per support staff, procedure is scheduled for 1 hour and is under block anesthesia.   However, without any access to pump data and without cortisol/ACTH, recommend rescheduling for later date.  Message left for Dr. Sapp.  -reached out to [employer] team. Account to be created to link data.   7/17/25: Contacted [employer].   Clarified that pt was trained by the [place] [employer] teams, initiated pump on ~5/25.   Received report that shows (last 30 days) TIR 56.%. high 41.1%, low 1.7%.  GMI 7.7%, average 184, CV 46.2. Pt with only 4 boluses.    -usual breakfast dose 5.1 U, lunch 9.8 U, dinner 5.2 U.  -total daily insulin 48 U.  total daily basal calculated as 24 U.  --> recent glucoses ~stable.  Surgery staff had noted procedure is ~ 1 hour wi block anesthesia.  Can proceed to OR and keep pump with CGM if not done under CT/MRI, fluoroscopy, X-rays, etc.  7/18/2025: Called pt and informed pt of plan that can proceed to OR.   Emphasized bolusing for all meals. Pt later admits that he may have only bolused a few times in the past month. Emphasized exchanging infusion set every 3 days. Pt admits that he may not do this because he doesn't want to. Pt states he will get early AM labs drawn next week for cortisol, ACTH.  Pt states had 2 vascular procedures done earlier this year (under local anethesia) without any issues.  No specific clinical symptoms s/o AI. States that he will be getting a steroid injection next week.  --> again emphasized importance of bolusing for all meals and to also input high glucose values for corrections. Pending next week's lab results, will update Surgical team. Procedure is not yet scheduled.  --> Rx sent for basal back-up insulin pen 24 Units every 24 hours if pump malfunctions. Pt aware to resume pump 24 hr after last basal dose.   Pt confirms he has back-up glucose meter and supplies, has pen needles, and bolus back-up insulin pen. -refill for Dexcom G7 sensors prescribed. Has active Fiasp cartridge refills. Pt confirms that he has all other pump supplies w refills.   Called pt to discuss results/plan.   All questions answered.   07/25/2025 7/24/25 ~9 AM cortisiol 12.2, ACTH 35.9.  No adrenal insufficiency. -called Dr. Sapp's office and updated that pt can proceed w procedure.  As per surgery staff, duration is ~ 1 hr and under block anesthesia. If not done under CT/MRI, fluoroscopy, nuclear scans, X-rays, etc, can continue insulin pump for procedure.    -If pump needs to be removed, SQ doses would be: 24 Units every 24 hours of basal Lantus insulin (pt confirms it is ready for delivery from pharmacy) and 5-9 Units AC of rapid-acting Fiasp for meals.   Spoke with Edward from Dr Sapp's office. Faxed last visit note, lab results to [phone]. Also updated patient.  All questions answered.

## 2025-07-25 NOTE — PHYSICAL EXAM
[Alert] : alert [No Acute Distress] : no acute distress [Normal Sclera/Conjunctiva] : normal sclera/conjunctiva [No Respiratory Distress] : no respiratory distress [Normal Rate] : heart rate was normal [Regular Rhythm] : with a regular rhythm [Normal Gait] : normal gait [Oriented x3] : oriented to person, place, and time [de-identified] : BMI 23 [de-identified] : No visible goiter [de-identified] : no hyperpigmentation.

## 2025-07-25 NOTE — ADDENDUM
[FreeTextEntry1] :  07/16/2025 Called Orthopedist Dr Sapp's office and clarified pt is scheduled for R cubital tunnel and R carpal tunnel release.  As per support staff, procedure is scheduled for 1 hour and is under block anesthesia.   However, without any access to pump data and without cortisol/ACTH, recommend rescheduling for later date.  Message left for Dr. Sapp.  -reached out to [employer] team. Account to be created to link data.   7/17/25: Contacted [employer].   Clarified that pt was trained by the [place] [employer] teams, initiated pump on ~5/25.   Received report that shows (last 30 days) TIR 56.%. high 41.1%, low 1.7%.  GMI 7.7%, average 184, CV 46.2. Pt with only 4 boluses.    -usual breakfast dose 5.1 U, lunch 9.8 U, dinner 5.2 U.  -total daily insulin 48 U.  total daily basal calculated as 24 U.  --> recent glucoses ~stable.  Surgery staff had noted procedure is ~ 1 hour wi block anesthesia.  Can proceed to OR and keep pump with CGM if not done under CT/MRI, fluoroscopy, X-rays, etc.  7/18/2025: Called pt and informed pt of plan that can proceed to OR.   Emphasized bolusing for all meals. Pt later admits that he may have only bolused a few times in the past month. Emphasized exchanging infusion set every 3 days. Pt admits that he may not do this because he doesn't want to. Pt states he will get early AM labs drawn next week for cortisol, ACTH.  Pt states had 2 vascular procedures done earlier this year (under local anethesia) without any issues.  No specific clinical symptoms s/o AI. States that he will be getting a steroid injection next week.  --> again emphasized importance of bolusing for all meals and to also input high glucose values for corrections. Pending next week's lab results, will update Surgical team. Procedure is not yet scheduled.  --> Rx sent for basal back-up insulin pen 24 Units every 24 hours if pump malfunctions. Pt aware to resume pump 24 hr after last basal dose.   Pt confirms he has back-up glucose meter and supplies, has pen needles, and bolus back-up insulin pen. -refill for Dexcom G7 sensors prescribed. Has active Fiasp cartridge refills. Pt confirms that he has all other pump supplies w refills.   Called pt to discuss results/plan.   All questions answered.   07/25/2025 7/24/25 ~9 AM cortisiol 12.2, ACTH 35.9.  No adrenal insufficiency. -called Dr. Sapp's office and updated that pt can proceed w procedure.  As per surgery staff, duration is ~ 1 hr and under block anesthesia. If not done under CT/MRI, fluoroscopy, nuclear scans, X-rays, etc, can continue insulin pump for procedure.    -If pump needs to be removed, SQ doses would be: 24 Units every 24 hours of basal Lantus insulin (pt confirms it is ready for delivery from pharmacy) and 5-9 Units AC of rapid-acting Fiasp for meals.   Spoke with Edward from Dr Sapp's office. Faxed last visit note, lab results to [phone]. Also updated patient.  All questions answered.

## 2025-07-25 NOTE — ADDENDUM
[FreeTextEntry1] :  07/16/2025 Called Orthopedist Dr Sapp's office and clarified pt is scheduled for R cubital tunnel and R carpal tunnel release.  As per support staff, procedure is scheduled for 1 hour and is under block anesthesia.   However, without any access to pump data and without cortisol/ACTH, recommend rescheduling for later date.  Message left for Dr. Sapp.  -reached out to [employer] team. Account to be created to link data.   7/17/25: Contacted [employer].   Clarified that pt was trained by the [place] [employer] teams, initiated pump on ~5/25.   Received report that shows (last 30 days) TIR 56.%. high 41.1%, low 1.7%.  GMI 7.7%, average 184, CV 46.2. Pt with only 4 boluses.    -usual breakfast dose 5.1 U, lunch 9.8 U, dinner 5.2 U.  -total daily insulin 48 U.  total daily basal calculated as 24 U.  --> recent glucoses ~stable.  Surgery staff had noted procedure is ~ 1 hour wi block anesthesia.  Can proceed to OR and keep pump with CGM if not done under CT/MRI, fluoroscopy, X-rays, etc.  7/18/2025: Called pt and informed pt of plan that can proceed to OR.   Emphasized bolusing for all meals. Pt later admits that he may have only bolused a few times in the past month. Emphasized exchanging infusion set every 3 days. Pt admits that he may not do this because he doesn't want to. Pt states he will get early AM labs drawn next week for cortisol, ACTH.  Pt states had 2 vascular procedures done earlier this year (under local anethesia) without any issues.  No specific clinical symptoms s/o AI. States that he will be getting a steroid injection next week.  --> again emphasized importance of bolusing for all meals and to also input high glucose values for corrections. Pending next week's lab results, will update Surgical team. Procedure is not yet scheduled.  --> Rx sent for basal back-up insulin pen 24 Units every 24 hours if pump malfunctions. Pt aware to resume pump 24 hr after last basal dose.   Pt confirms he has back-up glucose meter and supplies, has pen needles, and bolus back-up insulin pen. -refill for Dexcom G7 sensors prescribed. Has active Fiasp cartridge refills. Pt confirms that he has all other pump supplies w refills.   Called pt to discuss results/plan.   All questions answered.   07/25/2025 7/24/25 ~9 AM cortisiol 12.2, ACTH 35.9.  No adrenal insufficiency. -called Dr. Sapp's office and updated that pt can proceed w procedure.  As per surgery staff, duration is ~ 1 hr and under block anesthesia. If not done under CT/MRI, fluoroscopy, nuclear scans, X-rays, etc, can continue insulin pump for procedure.    -If pump needs to be removed, SQ doses would be: 24 Units every 24 hours of basal Lantus insulin (pt confirms it is ready for delivery from pharmacy) and 5-9 Units AC of rapid-acting Fiasp for meals.   Spoke with Edward from Dr Sapp's office. Faxed last visit note, lab results to [phone]. Also updated patient.  All questions answered.

## 2025-07-25 NOTE — HISTORY OF PRESENT ILLNESS
[FreeTextEntry1] : PCP: MARILU BOLIVAR  [phone] [facility] Surgery: Dr. Sapp ([phone]).    50 year M with h/o T1DM (c/b peripheral and autonomic neuropathy), chronic orthostatic hypotension, ?HTN, radiculopathy/chronic back pain, GERD, ?hypogonadism for DM.  INITIAL VISIT 07/15/2025: -had e-consult Endocrine (Dr. KARSTEN Vivar, 7/10/25): As per note: " dx with diabetic cardiac autonomic insufficiency.  Plan included reaching out to [employer] to troubleshot pump issues.  Can check various labs.  Afternoon PM cortisol.   -had last in-office Endocrine visit note 4/15/2025 w Dr. HARITHA Rodriguez. As per note:        -diagnosed with MAL in 2024, prior to that was treated as T2DM at times since 2018. Started insulin therapy about a year ago.    -Current regimen: Lantus 24 units qhs, Humalog 15 units TIDAC. reports adherence but does express his opinion that he does not think lantus is very important.    -Plan: Referred for beta bionic.  In the meantime, increase Lantus to 30 U HS and increase Humalog to 18 U.     -Unclear if he had true hx of hypogonadism.  Testosterond 488 in normal range.    -recommended f/u w Endocrinology in 3 months.    -pt is here today, stating that the prior provider (?endocrinologist) recommended that pt f/u with Endocrinology at [address] although has a f/u appt scheduled in 10/2025 with same endocrinology clinic.  #) T1DM: -POC HbA1c today 8.8%; POCG 134 today (~3 hr postprandial: states bolused for lunch) -using I[employer] pump (SN [id number]).  Does not know which insulin he uses in pump (as per meds list, Fiasp). -states boluses for all meals. Denies snacking.  Does not carb-count. -states that he exchanges pump cartridge and infusion set whenever the insulin runs low (and does not exchange every 3 days) -states that he has bolus insulin pens and pen needles at home.  Does not have basal insulin. States he was last taking basal insulin 16 Units.  -states is planned for carpal tunnel surgery this Thursday (7/16/25) at [facility].  -states that prior surgery date was cancelled due to hyperglycemia, n/v (pt states that CGM was malfunctioning; was incorrectly reading low glucoses when was actually hyperglycemic;  ED visit ~2 weeks ago at [facility]).   DM HISTORY: -diagnosed w T1DM/MAL in ~2018 during [employer] () employment renewal -prior hospitalizations: initially stated multiple times and later states, only 2 prior hospitalizations/ED visit for DM.  2/2025 and ~2 weeks ago.     -Was last in the hospital (~2 weeks ago, [facility] ED) when was planned for carpal tunnel surgery (deferred due to hyperglycemia and nausea/vomiting); states was in setting of malfunctioning CGM.   -prior 2/2025 hospitalization at [facility] As per inpt endocrine consult note: presents to the hospital for persistent nausea/emesis, and failure to thrive.  HbA1c 10.1%.  Mild DKA:  AG 17, bicarb 21 with , pH 7.32    - Home regimen: Lantus 24 units nightly + Humalog 12 units with meals    - Egfr: 112    - Sugars are running severely high at home- above range 90% of the time  -SOCIAL HX: -currently unemployed -resides in shelter   COMPLICATIONS: neuropathy ?DKA   GLUCOSE monitoring: Dexcom G7 -pt does not have Dexcom clarity nicole. Unable to view CGM data. -unable to view pump data (pt is not linked to an account)   #) H/o orthostatic hypotension: -Meds list includes midodrine.  Fludrocortisone 0.1 mg daily started 7/2/2025. As per PCP note (7/2/2025), likely 2/2 cardiac autonomic neuropathy.  CURRENT MEDICATIONS (relevant): -iLet pump (?Fiasp) -fludrocortisone 0.1 mg dailiy -midodrine 10 mg TID -pantoprazole -duloxetine

## 2025-07-25 NOTE — PHYSICAL EXAM
[Alert] : alert [No Acute Distress] : no acute distress [Normal Sclera/Conjunctiva] : normal sclera/conjunctiva [No Respiratory Distress] : no respiratory distress [Normal Rate] : heart rate was normal [Regular Rhythm] : with a regular rhythm [Normal Gait] : normal gait [Oriented x3] : oriented to person, place, and time [de-identified] : BMI 23 [de-identified] : No visible goiter [de-identified] : no hyperpigmentation.